# Patient Record
Sex: MALE | Race: WHITE | HISPANIC OR LATINO | Employment: OTHER | URBAN - METROPOLITAN AREA
[De-identification: names, ages, dates, MRNs, and addresses within clinical notes are randomized per-mention and may not be internally consistent; named-entity substitution may affect disease eponyms.]

---

## 2017-07-13 PROBLEM — R94.39 POSITIVE CARDIAC STRESS TEST: Status: ACTIVE | Noted: 2017-07-13

## 2022-07-12 ENCOUNTER — HOSPITAL ENCOUNTER (OUTPATIENT)
Dept: PREADMISSION TESTING | Facility: OTHER | Age: 72
Discharge: HOME | End: 2022-07-12
Attending: INTERNAL MEDICINE
Payer: MEDICARE

## 2022-07-12 VITALS
HEIGHT: 67 IN | SYSTOLIC BLOOD PRESSURE: 145 MMHG | WEIGHT: 180.75 LBS | DIASTOLIC BLOOD PRESSURE: 65 MMHG | BODY MASS INDEX: 28.37 KG/M2 | OXYGEN SATURATION: 97 % | HEART RATE: 69 BPM | TEMPERATURE: 98 F

## 2022-07-12 DIAGNOSIS — Z01.818 PRE-OP TESTING: Primary | ICD-10-CM

## 2022-07-12 DIAGNOSIS — I70.203 ATHEROSCLEROSIS OF ARTERY OF BOTH LOWER EXTREMITIES: ICD-10-CM

## 2022-07-12 LAB
APTT BLDCRRT: 28.8 SEC (ref 21–32)
INR PPP: 0.9 (ref 0.8–1.2)
PROTHROMBIN TIME: 10.4 SEC (ref 9–12.5)

## 2022-07-12 PROCEDURE — 85610 PROTHROMBIN TIME: CPT | Performed by: INTERNAL MEDICINE

## 2022-07-12 PROCEDURE — 93010 ELECTROCARDIOGRAM REPORT: CPT | Mod: ,,, | Performed by: INTERNAL MEDICINE

## 2022-07-12 PROCEDURE — 93010 EKG 12-LEAD: ICD-10-PCS | Mod: ,,, | Performed by: INTERNAL MEDICINE

## 2022-07-12 PROCEDURE — 85730 THROMBOPLASTIN TIME PARTIAL: CPT | Performed by: INTERNAL MEDICINE

## 2022-07-12 PROCEDURE — 93005 ELECTROCARDIOGRAM TRACING: CPT

## 2022-07-12 PROCEDURE — 36415 COLL VENOUS BLD VENIPUNCTURE: CPT | Performed by: INTERNAL MEDICINE

## 2022-07-12 RX ORDER — IPRATROPIUM BROMIDE 21 UG/1
2 SPRAY, METERED NASAL DAILY PRN
COMMUNITY

## 2022-07-12 RX ORDER — NAPROXEN 500 MG/1
500 TABLET ORAL
Status: ON HOLD | COMMUNITY
End: 2022-08-19 | Stop reason: HOSPADM

## 2022-07-12 RX ORDER — PHENYLEPHRINE HCL 10 MG
500 TABLET ORAL DAILY
Status: ON HOLD | COMMUNITY
End: 2022-08-19 | Stop reason: HOSPADM

## 2022-07-12 RX ORDER — CALCIUM CARB, CITRATE, MALATE 250 MG
CAPSULE ORAL
Status: ON HOLD | COMMUNITY
End: 2022-08-19 | Stop reason: HOSPADM

## 2022-07-12 RX ORDER — ZOLPIDEM TARTRATE 10 MG/1
5 TABLET ORAL NIGHTLY PRN
COMMUNITY
End: 2023-07-14 | Stop reason: CLARIF

## 2022-07-12 RX ORDER — LANOLIN ALCOHOL/MO/W.PET/CERES
100 CREAM (GRAM) TOPICAL DAILY
COMMUNITY

## 2022-07-12 RX ORDER — CLOPIDOGREL BISULFATE 75 MG/1
75 TABLET ORAL DAILY
COMMUNITY

## 2022-07-12 RX ORDER — ACETAMINOPHEN 500 MG
5000 TABLET ORAL DAILY
Status: ON HOLD | COMMUNITY
End: 2022-08-19 | Stop reason: HOSPADM

## 2022-07-12 RX ORDER — BROMOCRIPTINE MESYLATE 2.5 MG/1
2.5 TABLET ORAL DAILY
COMMUNITY

## 2022-07-12 RX ORDER — LORAZEPAM 0.5 MG/1
0.5 TABLET ORAL EVERY 6 HOURS PRN
COMMUNITY

## 2022-07-12 RX ORDER — VITAMIN B COMPLEX
1 CAPSULE ORAL WEEKLY
Status: ON HOLD | COMMUNITY
End: 2022-08-19 | Stop reason: HOSPADM

## 2022-07-12 RX ORDER — CETIRIZINE HYDROCHLORIDE 10 MG/1
10 TABLET ORAL DAILY
COMMUNITY
End: 2022-08-19

## 2022-07-12 RX ORDER — IBUPROFEN 100 MG/5ML
1000 SUSPENSION, ORAL (FINAL DOSE FORM) ORAL DAILY
COMMUNITY
End: 2023-07-14 | Stop reason: CLARIF

## 2022-07-12 RX ORDER — MAGNESIUM 200 MG
TABLET ORAL ONCE
Status: ON HOLD | COMMUNITY
End: 2022-08-19 | Stop reason: HOSPADM

## 2022-07-12 RX ORDER — DIAZEPAM 5 MG/1
5 TABLET ORAL ONCE
Status: DISCONTINUED | OUTPATIENT
Start: 2022-07-15 | End: 2022-07-12

## 2022-07-12 NOTE — DISCHARGE INSTRUCTIONS
Information to Prepare you for your Surgery    PRE-ADMIT TESTING -  [phone]    [address] [facility]          Your surgery has been scheduled at [facility]. We are pleased to have the opportunity to serve you. For Further Information please call [phone].    On the day of surgery please report to the Information Desk on the 1st floor.    CONTACT YOUR PHYSICIAN'S OFFICE THE DAY PRIOR TO YOUR SURGERY TO OBTAIN YOUR ARRIVAL TIME.     The evening before surgery do not eat anything after 9 p.m. ( this includes hard candy, chewing gum and mints).  You may only have GATORADE, POWERADE AND WATER  from 9 p.m. until you leave your home.   DO NOT DRINK ANY LIQUIDS ON THE WAY TO THE HOSPITAL.      Why does your anesthesiologist allow you to drink Gatorade/Powerade before surgery?  Gatorade/Powerade helps to increase your comfort before surgery and to decrease your nausea after surgery. The carbohydrates in Gatorade/Powerade help reduce your body's stress response to surgery.  If you are a diabetic-drink only water prior to surgery.      Current Visitor policy(12/27/2021) - Patients may have 2 visitors pre and post procedure. Only 2 visitors will be allowed in the Surgical building with the patient.     SPECIAL MEDICATION INSTRUCTIONS: TAKE medications checked off by the Anesthesiologist on your Medication List.    Angiogram Patients: Take medications as instructed by your physician, including aspirin.     Surgery Patients:    If you take ASPIRIN - Your PHYSICIAN/SURGEON will need to inform you IF/OR when you need to stop taking aspirin prior to your surgery.     Do Not take any medications containing IBUPROFEN.    Do Not Wear any make-up (especially eye make-up) to surgery. Please remove any false eyelashes or eyelash extensions. If you arrive the day of surgery with makeup/eyelashes on you will be required to remove prior to surgery. (There is a risk of corneal  abrasions if eye makeup/eyelash extensions are not removed)      Leave all valuables at home.   Do Not wear any jewelry or watches, including any metal in body piercings. Jewelry must be removed prior to coming to the hospital.  There is a possibility that rings that are unable to be removed may be cut off if they are on the surgical extremity.    Please remove all hair extensions, wigs, clips and any other metal accessories/ ornaments from your hair.  These items may pose a flammable/fire risk in Surgery and must be removed.    Do not shave your surgical area at least 5 days prior to your surgery. The surgical prep will be performed at the hospital according to Infection Control regulations.    Contact Lens must be removed before surgery. Either do not wear the contact lens or bring a case and solution for storage.  Please bring a container for eyeglasses or dentures as required.  Bring any paperwork your physician has provided, such as consent forms,  history and physicals, doctor's orders, etc.   Bring comfortable clothes that are loose fitting to wear upon discharge. Take into consideration the type of surgery being performed.  Maintain your diet as advised per your physician the day prior to surgery.      Adequate rest the night before surgery is advised.   Park in the Parking lot behind the hospital or in the [employer] [facility] across the street from the parking lot. Parking is complimentary.  If you will be discharged the same day as your procedure, please arrange for a responsible adult to drive you home or to accompany you if traveling by taxi.   YOU WILL NOT BE PERMITTED TO DRIVE OR TO LEAVE THE HOSPITAL ALONE AFTER SURGERY.   If you are being discharged the same day, it is strongly recommended that you arrange for someone to remain with you for the first 24 hrs following your surgery.    The Surgeon will speak to your family/visitor after your surgery regarding the outcome of your surgery and post op  care.  The Surgeon may speak to you after your surgery, but there is a possibility you may not remember the details.  Please check with your family members regarding the conversation with the Surgeon.    We strongly recommend whoever is bringing you home be present for discharge instructions.  This will ensure a thorough understanding for your post op home care.    ALL CHILDREN MUST ALWAYS BE ACCOMPANIED BY AN ADULT.    Visitors-Refer to current Visitor policy handouts.    Thank you for your cooperation.  The Staff of [facility].            Bathing Instructions with Hibiclens    Shower the evening before and morning of your procedure with Hibiclens:  Wash your face with water and your regular face wash/soap  Apply Hibiclens directly on your skin or on a wet washcloth and wash gently. When showering: Move away from the shower stream when applying Hibiclens to avoid rinsing off too soon.  Rinse thoroughly with warm water  Do not dilute Hibiclens        Dry off as usual, do not use any deodorant, powder, body lotions, perfume, after shave or cologne.

## 2022-08-19 ENCOUNTER — HOSPITAL ENCOUNTER (OUTPATIENT)
Facility: OTHER | Age: 72
Discharge: HOME | End: 2022-08-20
Attending: INTERNAL MEDICINE | Admitting: INTERNAL MEDICINE
Payer: MEDICARE

## 2022-08-19 DIAGNOSIS — Z01.818 PRE-OP TESTING: ICD-10-CM

## 2022-08-19 DIAGNOSIS — I70.203 ATHEROSCLEROSIS OF ARTERY OF BOTH LOWER EXTREMITIES: ICD-10-CM

## 2022-08-19 DIAGNOSIS — I70.203: ICD-10-CM

## 2022-08-19 LAB
POCT GLUCOSE: 233 MG/DL (ref 70–110)
POCT GLUCOSE: 291 MG/DL (ref 70–110)
POCT GLUCOSE: 353 MG/DL (ref 70–110)
POCT GLUCOSE: 386 MG/DL (ref 70–110)

## 2022-08-19 PROCEDURE — 36247 INS CATH ABD/L-EXT ART 3RD: CPT | Mod: LT | Performed by: INTERNAL MEDICINE

## 2022-08-19 PROCEDURE — 37225 HC FEM/POPL REVAS W/ATHER: CPT | Mod: LT | Performed by: INTERNAL MEDICINE

## 2022-08-19 PROCEDURE — 82962 GLUCOSE BLOOD TEST: CPT | Performed by: INTERNAL MEDICINE

## 2022-08-19 PROCEDURE — 94761 N-INVAS EAR/PLS OXIMETRY MLT: CPT

## 2022-08-19 PROCEDURE — C1887 CATHETER, GUIDING: HCPCS | Performed by: INTERNAL MEDICINE

## 2022-08-19 PROCEDURE — 37229 HC TIB/PER REVASC W/ATHER: CPT | Mod: LT | Performed by: INTERNAL MEDICINE

## 2022-08-19 PROCEDURE — 63600175 PHARM REV CODE 636 W HCPCS: Performed by: INTERNAL MEDICINE

## 2022-08-19 PROCEDURE — 99153 MOD SED SAME PHYS/QHP EA: CPT | Performed by: INTERNAL MEDICINE

## 2022-08-19 PROCEDURE — C1894 INTRO/SHEATH, NON-LASER: HCPCS | Performed by: INTERNAL MEDICINE

## 2022-08-19 PROCEDURE — 75625 CONTRAST EXAM ABDOMINL AORTA: CPT | Performed by: INTERNAL MEDICINE

## 2022-08-19 PROCEDURE — C1725 CATH, TRANSLUMIN NON-LASER: HCPCS | Performed by: INTERNAL MEDICINE

## 2022-08-19 PROCEDURE — 99152 MOD SED SAME PHYS/QHP 5/>YRS: CPT | Performed by: INTERNAL MEDICINE

## 2022-08-19 PROCEDURE — 25000003 PHARM REV CODE 250: Performed by: INTERNAL MEDICINE

## 2022-08-19 PROCEDURE — C1769 GUIDE WIRE: HCPCS | Performed by: INTERNAL MEDICINE

## 2022-08-19 PROCEDURE — C2623 CATH, TRANSLUMIN, DRUG-COAT: HCPCS | Performed by: INTERNAL MEDICINE

## 2022-08-19 PROCEDURE — 25500020 PHARM REV CODE 255: Performed by: INTERNAL MEDICINE

## 2022-08-19 PROCEDURE — 27201423 OPTIME MED/SURG SUP & DEVICES STERILE SUPPLY: Performed by: INTERNAL MEDICINE

## 2022-08-19 PROCEDURE — C1760 CLOSURE DEV, VASC: HCPCS | Performed by: INTERNAL MEDICINE

## 2022-08-19 RX ORDER — NAPROXEN SODIUM 220 MG/1
81 TABLET, FILM COATED ORAL ONCE
Status: DISCONTINUED | OUTPATIENT
Start: 2022-08-19 | End: 2022-08-19

## 2022-08-19 RX ORDER — SODIUM CHLORIDE 9 MG/ML
INJECTION, SOLUTION INTRAVENOUS CONTINUOUS
Status: DISCONTINUED | OUTPATIENT
Start: 2022-08-19 | End: 2022-08-19

## 2022-08-19 RX ORDER — DIPHENHYDRAMINE HCL 25 MG
25 CAPSULE ORAL
Status: COMPLETED | OUTPATIENT
Start: 2022-08-19 | End: 2022-08-19

## 2022-08-19 RX ORDER — GLUCAGON 1 MG
1 KIT INJECTION
Status: DISCONTINUED | OUTPATIENT
Start: 2022-08-19 | End: 2022-08-20 | Stop reason: HOSPADM

## 2022-08-19 RX ORDER — ATROPINE SULFATE 0.1 MG/ML
0.5 INJECTION INTRAVENOUS
Status: DISCONTINUED | OUTPATIENT
Start: 2022-08-19 | End: 2022-08-20 | Stop reason: HOSPADM

## 2022-08-19 RX ORDER — HEPARIN SOD,PORCINE/0.9 % NACL 1000/500ML
INTRAVENOUS SOLUTION INTRAVENOUS
Status: DISCONTINUED | OUTPATIENT
Start: 2022-08-19 | End: 2022-08-19 | Stop reason: HOSPADM

## 2022-08-19 RX ORDER — DIAZEPAM 5 MG/1
5 TABLET ORAL
Status: COMPLETED | OUTPATIENT
Start: 2022-08-19 | End: 2022-08-19

## 2022-08-19 RX ORDER — DEXTROSE MONOHYDRATE 100 MG/ML
INJECTION, SOLUTION INTRAVENOUS CONTINUOUS PRN
Status: DISCONTINUED | OUTPATIENT
Start: 2022-08-19 | End: 2022-08-19

## 2022-08-19 RX ORDER — ACETAMINOPHEN 325 MG/1
650 TABLET ORAL EVERY 4 HOURS PRN
Status: DISCONTINUED | OUTPATIENT
Start: 2022-08-19 | End: 2022-08-20 | Stop reason: HOSPADM

## 2022-08-19 RX ORDER — NITROGLYCERIN 0.4 MG/1
0.4 TABLET SUBLINGUAL EVERY 5 MIN PRN
Status: DISCONTINUED | OUTPATIENT
Start: 2022-08-19 | End: 2022-08-20 | Stop reason: HOSPADM

## 2022-08-19 RX ORDER — CLOPIDOGREL 300 MG/1
TABLET, FILM COATED ORAL
Status: DISCONTINUED | OUTPATIENT
Start: 2022-08-19 | End: 2022-08-19 | Stop reason: HOSPADM

## 2022-08-19 RX ORDER — MAG HYDROX/ALUMINUM HYD/SIMETH 200-200-20
15 SUSPENSION, ORAL (FINAL DOSE FORM) ORAL EVERY 6 HOURS PRN
Status: DISCONTINUED | OUTPATIENT
Start: 2022-08-19 | End: 2022-08-20 | Stop reason: HOSPADM

## 2022-08-19 RX ORDER — NITROGLYCERIN 5 MG/ML
INJECTION, SOLUTION INTRAVENOUS
Status: DISCONTINUED | OUTPATIENT
Start: 2022-08-19 | End: 2022-08-19 | Stop reason: HOSPADM

## 2022-08-19 RX ORDER — VERAPAMIL HYDROCHLORIDE 2.5 MG/ML
INJECTION, SOLUTION INTRAVENOUS
Status: DISCONTINUED | OUTPATIENT
Start: 2022-08-19 | End: 2022-08-19 | Stop reason: HOSPADM

## 2022-08-19 RX ORDER — MIDAZOLAM HYDROCHLORIDE 1 MG/ML
INJECTION, SOLUTION INTRAMUSCULAR; INTRAVENOUS
Status: DISCONTINUED | OUTPATIENT
Start: 2022-08-19 | End: 2022-08-19 | Stop reason: HOSPADM

## 2022-08-19 RX ORDER — METFORMIN HYDROCHLORIDE 500 MG/1
500 TABLET ORAL 2 TIMES DAILY WITH MEALS
Status: DISCONTINUED | OUTPATIENT
Start: 2022-08-19 | End: 2022-08-20 | Stop reason: HOSPADM

## 2022-08-19 RX ORDER — IBUPROFEN 200 MG
24 TABLET ORAL
Status: DISCONTINUED | OUTPATIENT
Start: 2022-08-19 | End: 2022-08-20 | Stop reason: HOSPADM

## 2022-08-19 RX ORDER — HEPARIN SODIUM 1000 [USP'U]/ML
INJECTION, SOLUTION INTRAVENOUS; SUBCUTANEOUS
Status: DISCONTINUED | OUTPATIENT
Start: 2022-08-19 | End: 2022-08-19 | Stop reason: HOSPADM

## 2022-08-19 RX ORDER — LIDOCAINE HYDROCHLORIDE 10 MG/ML
INJECTION, SOLUTION EPIDURAL; INFILTRATION; INTRACAUDAL; PERINEURAL
Status: DISCONTINUED | OUTPATIENT
Start: 2022-08-19 | End: 2022-08-19 | Stop reason: HOSPADM

## 2022-08-19 RX ORDER — HYDROCODONE BITARTRATE AND ACETAMINOPHEN 10; 325 MG/1; MG/1
1 TABLET ORAL EVERY 4 HOURS PRN
Status: DISCONTINUED | OUTPATIENT
Start: 2022-08-19 | End: 2022-08-20 | Stop reason: HOSPADM

## 2022-08-19 RX ORDER — GLUCAGON 1 MG
1 KIT INJECTION
Status: DISCONTINUED | OUTPATIENT
Start: 2022-08-19 | End: 2022-08-19

## 2022-08-19 RX ORDER — SODIUM CHLORIDE 9 MG/ML
INJECTION, SOLUTION INTRAVENOUS CONTINUOUS
Status: ACTIVE | OUTPATIENT
Start: 2022-08-19 | End: 2022-08-19

## 2022-08-19 RX ORDER — ALPRAZOLAM 0.25 MG/1
0.25 TABLET ORAL EVERY 8 HOURS PRN
Status: DISCONTINUED | OUTPATIENT
Start: 2022-08-19 | End: 2022-08-20 | Stop reason: HOSPADM

## 2022-08-19 RX ORDER — ASPIRIN 81 MG/1
81 TABLET ORAL DAILY
Status: DISCONTINUED | OUTPATIENT
Start: 2022-08-20 | End: 2022-08-20 | Stop reason: HOSPADM

## 2022-08-19 RX ORDER — FENTANYL CITRATE 50 UG/ML
INJECTION, SOLUTION INTRAMUSCULAR; INTRAVENOUS
Status: DISCONTINUED | OUTPATIENT
Start: 2022-08-19 | End: 2022-08-19 | Stop reason: HOSPADM

## 2022-08-19 RX ORDER — IODIXANOL 270 MG/ML
INJECTION, SOLUTION INTRAVASCULAR
Status: DISCONTINUED | OUTPATIENT
Start: 2022-08-19 | End: 2022-08-19 | Stop reason: HOSPADM

## 2022-08-19 RX ORDER — NAPROXEN SODIUM 220 MG/1
81 TABLET, FILM COATED ORAL ONCE
Status: COMPLETED | OUTPATIENT
Start: 2022-08-19 | End: 2022-08-19

## 2022-08-19 RX ORDER — LIDOCAINE HYDROCHLORIDE 10 MG/ML
10 INJECTION INFILTRATION; PERINEURAL ONCE AS NEEDED
Status: DISCONTINUED | OUTPATIENT
Start: 2022-08-19 | End: 2022-08-20 | Stop reason: HOSPADM

## 2022-08-19 RX ORDER — DIPHENHYDRAMINE HYDROCHLORIDE 50 MG/ML
25 INJECTION INTRAMUSCULAR; INTRAVENOUS EVERY 6 HOURS PRN
Status: DISCONTINUED | OUTPATIENT
Start: 2022-08-19 | End: 2022-08-20 | Stop reason: HOSPADM

## 2022-08-19 RX ORDER — CLOPIDOGREL BISULFATE 75 MG/1
75 TABLET ORAL DAILY
Status: DISCONTINUED | OUTPATIENT
Start: 2022-08-20 | End: 2022-08-20 | Stop reason: HOSPADM

## 2022-08-19 RX ORDER — HYDROCODONE BITARTRATE AND ACETAMINOPHEN 5; 325 MG/1; MG/1
1 TABLET ORAL EVERY 4 HOURS PRN
Status: DISCONTINUED | OUTPATIENT
Start: 2022-08-19 | End: 2022-08-20 | Stop reason: HOSPADM

## 2022-08-19 RX ORDER — IBUPROFEN 200 MG
16 TABLET ORAL
Status: DISCONTINUED | OUTPATIENT
Start: 2022-08-19 | End: 2022-08-20 | Stop reason: HOSPADM

## 2022-08-19 RX ORDER — ONDANSETRON 8 MG/1
8 TABLET, ORALLY DISINTEGRATING ORAL EVERY 8 HOURS PRN
Status: DISCONTINUED | OUTPATIENT
Start: 2022-08-19 | End: 2022-08-20 | Stop reason: HOSPADM

## 2022-08-19 RX ADMIN — METFORMIN HYDROCHLORIDE 500 MG: 500 TABLET, FILM COATED ORAL at 05:08

## 2022-08-19 RX ADMIN — ALPRAZOLAM 0.25 MG: 0.25 TABLET ORAL at 10:08

## 2022-08-19 RX ADMIN — DIPHENHYDRAMINE HYDROCHLORIDE 25 MG: 25 CAPSULE ORAL at 06:08

## 2022-08-19 RX ADMIN — ASPIRIN 81 MG CHEWABLE TABLET 81 MG: 81 TABLET CHEWABLE at 07:08

## 2022-08-19 RX ADMIN — DIAZEPAM 5 MG: 5 TABLET ORAL at 06:08

## 2022-08-19 RX ADMIN — SODIUM CHLORIDE: 0.9 INJECTION, SOLUTION INTRAVENOUS at 11:08

## 2022-08-19 RX ADMIN — SODIUM CHLORIDE: 0.9 INJECTION, SOLUTION INTRAVENOUS at 06:08

## 2022-08-19 RX ADMIN — SITAGLIPTIN 50 MG: 25 TABLET, FILM COATED ORAL at 05:08

## 2022-08-19 NOTE — PLAN OF CARE
Pt prefers wife Joya at the bedside for discharge.  Pt blood sugar was 233, did not take his low dose aspirin the morning of the surgery, has bruise at R ankle. Dr Mendiola called but did not answer the phone, left message and waiting for him to call back for updates.

## 2022-08-19 NOTE — NURSING
Spoke with MD regarding elevated POCT BGs,  Ok by MD to give home meds- janumet available by our pharmacy (januvia, metformin)     Patient ambulated to bathroom with no problems, no pain, no complains    Will continue to monitor

## 2022-08-19 NOTE — Clinical Note
The left DP pulse was detected with doppler. The right DP pulse was 1+.  The left PT pulse was detected with doppler. The right PT pulse was 1+.

## 2022-08-19 NOTE — Clinical Note
An angiography of the  abdominal aorta was performed with the catheter and power injected with 80 mL contrast at at 8 mL/s.

## 2022-08-20 VITALS
TEMPERATURE: 98 F | HEART RATE: 75 BPM | DIASTOLIC BLOOD PRESSURE: 99 MMHG | OXYGEN SATURATION: 97 % | HEIGHT: 67 IN | SYSTOLIC BLOOD PRESSURE: 129 MMHG | RESPIRATION RATE: 24 BRPM | BODY MASS INDEX: 29.27 KG/M2 | WEIGHT: 186.5 LBS

## 2022-08-20 LAB
ANION GAP SERPL CALC-SCNC: 8 MMOL/L (ref 8–16)
BUN SERPL-MCNC: 15 MG/DL (ref 8–23)
CALCIUM SERPL-MCNC: 8.8 MG/DL (ref 8.7–10.5)
CHLORIDE SERPL-SCNC: 103 MMOL/L (ref 95–110)
CO2 SERPL-SCNC: 24 MMOL/L (ref 23–29)
CREAT SERPL-MCNC: 0.9 MG/DL (ref 0.5–1.4)
EST. GFR  (NO RACE VARIABLE): >60 ML/MIN/1.73 M^2
GLUCOSE SERPL-MCNC: 291 MG/DL (ref 70–110)
HCT VFR BLD AUTO: 40 % (ref 40–54)
HGB BLD-MCNC: 13.7 G/DL (ref 14–18)
POTASSIUM SERPL-SCNC: 4.4 MMOL/L (ref 3.5–5.1)
SODIUM SERPL-SCNC: 135 MMOL/L (ref 136–145)

## 2022-08-20 PROCEDURE — 36415 COLL VENOUS BLD VENIPUNCTURE: CPT | Performed by: INTERNAL MEDICINE

## 2022-08-20 PROCEDURE — 80048 BASIC METABOLIC PNL TOTAL CA: CPT | Performed by: INTERNAL MEDICINE

## 2022-08-20 PROCEDURE — 85018 HEMOGLOBIN: CPT | Performed by: INTERNAL MEDICINE

## 2022-08-20 PROCEDURE — 25000003 PHARM REV CODE 250: Performed by: INTERNAL MEDICINE

## 2022-08-20 PROCEDURE — 85014 HEMATOCRIT: CPT | Performed by: INTERNAL MEDICINE

## 2022-08-20 RX ADMIN — ASPIRIN 81 MG: 81 TABLET, COATED ORAL at 08:08

## 2022-08-20 RX ADMIN — METFORMIN HYDROCHLORIDE 500 MG: 500 TABLET, FILM COATED ORAL at 07:08

## 2022-08-20 RX ADMIN — SITAGLIPTIN 50 MG: 25 TABLET, FILM COATED ORAL at 07:08

## 2022-08-20 RX ADMIN — CLOPIDOGREL 75 MG: 75 TABLET, FILM COATED ORAL at 08:08

## 2022-08-20 NOTE — PLAN OF CARE
Problem: Adult Inpatient Plan of Care  Goal: Absence of Hospital-Acquired Illness or Injury  Outcome: Ongoing, Progressing  Intervention: Identify and Manage Fall Risk  Flowsheets (Taken 8/20/2022 0404)  Safety Promotion/Fall Prevention:   assistive device/personal item within reach   bed alarm set   commode/urinal/bedpan at bedside   Fall Risk reviewed with patient/family   Fall Risk signage in place   lighting adjusted   medications reviewed   nonskid shoes/socks when out of bed   pulse ox   room near unit station   side rails raised x 2   instructed to call staff for mobility  Intervention: Prevent Skin Injury  Flowsheets (Taken 8/20/2022 0404)  Body Position:   weight shifting   position changed independently  Skin Protection:   adhesive use limited   incontinence pads utilized   silicone foam dressing in place   skin-to-skin areas padded   tubing/devices free from skin contact   transparent dressing maintained  Intervention: Prevent and Manage VTE (Venous Thromboembolism) Risk  Flowsheets (Taken 8/20/2022 0404)  Activity Management: Ambulated to bathroom - L4  VTE Prevention/Management:   ambulation promoted   bleeding precations maintained   bleeding risk assessed   fluids promoted   ROM (active) performed  Range of Motion: active ROM (range of motion) encouraged  Intervention: Prevent Infection  Flowsheets (Taken 8/20/2022 0404)  Infection Prevention:   equipment surfaces disinfected   hand hygiene promoted   personal protective equipment utilized   rest/sleep promoted

## 2022-08-20 NOTE — DISCHARGE SUMMARY
[facility] Intensive [facility]  Cardiology  Discharge Summary      Patient Name: Marcelo Mueller    Admission Date: 8/19/2022    Discharge Date and Time: 8/20/22    Final Diagnoses: PAD   Principal Problem: Atherosclerosis of artery of both lower extremities    HPI:  Mr. Mueller is a 72-year-old gentleman who presented to the office with complaints of exertional pain in the left calf.  He has a history of diabetes mellitus since 2005, hypertension, has had a drug-eluting stent placed in the obvious marginal branch in June 2018.  Had a drug-eluting stent placed in the right coronary artery in November 2011.  He underwent orbital atherectomy and drug coated balloon angioplasty of the right SFA in November 2021.  He is in remission for Hodgkin's disease in the retroperitoneal lymph nodes.  He is an ex cigarette smoker.  He was admitted for aortofemoral angiography via the right groin.      Impression on Admission:  Peripheral vascular disease    Hospital Course:  At angiography he was noted to have calcific tandem stenoses in the left mid SFA.  Below the knee he had a tubular stenosis in the tibioperoneal trunk, and a patent single-vessel peroneal runoff.  The anterior tibial and posterior tibial on the left side were occluded.  He underwent orbital atherectomy and drug coated balloon angioplasty of the left SFA, and orbital atherectomy and balloon angioplasty of the left tibioperoneal trunk, with good radiographic results.  A Mynx closure was applied to the right femoral artery.  He was discharged following morning for outpatient follow-up and to resume his by Janumet.  Disposition:  Discharged to home  Follow up/Patient Instructions:    Medications:     Medication List      CONTINUE taking these medications    amLODIPine 5 MG tablet  Commonly known as: NORVASC     ascorbic acid (vitamin C) 1000 MG tablet  Commonly known as: VITAMIN C     aspirin 81 MG EC tablet  Commonly known as: ECOTRIN     atorvastatin 40 MG  tablet  Commonly known as: LIPITOR     bromocriptine 2.5 mg Tab  Commonly known as: PARLODEL     clopidogreL 75 mg tablet  Commonly known as: PLAVIX     cyanocobalamin 1000 MCG tablet  Commonly known as: VITAMIN B-12     ipratropium 21 mcg (0.03 %) nasal spray  Commonly known as: ATROVENT     LORazepam 0.5 MG tablet  Commonly known as: ATIVAN     losartan 50 MG tablet  Commonly known as: COZAAR     SITagliptan-metformin  mg per tablet  Commonly known as: JANUMET     zolpidem 10 mg Tab  Commonly known as: AMBIEN        STOP taking these medications    b complex vitamins capsule     cetirizine 10 MG tablet  Commonly known as: ZYRTEC     cholecalciferol (vitamin D3) 125 mcg (5,000 unit) Tab     cinnamon bark 500 mg capsule     magnesium 200 mg Tab     naproxen 500 MG tablet  Commonly known as: NAPROSYN     potassium citrate 99 mg Cap     SANCUSO 3.1 mg/24 hour  Generic drug: granisetron     UNABLE TO FIND     UNABLE TO FIND     UNABLE TO FIND          No discharge procedures on file.   Follow-up Information     Javon Mendiola MD Follow up in 2 week(s).    Specialties: Cardiology, Interventional Cardiology  Contact information:  [address]  [facility] [id number] [phone]             Javon Mendiola MD Follow up in 2 week(s).    Specialties: Cardiology, Interventional Cardiology  Contact information:  [address]  [facility] [address] [phone]                         Condition upon Discharge:  Good        Javon Mendiola MD

## 2022-08-20 NOTE — NURSING
AVS summary printed. Discharge instructions and education provided. Pt voices understanding. IV site removed, cath tip intact, telemetry discontinued without adverse reactions. Pt shows no acute distress. Pt wheeled down. Pt picked up by wife.

## 2022-08-21 LAB — POCT GLUCOSE: 260 MG/DL (ref 70–110)

## 2022-08-31 NOTE — H&P
[facility] Intensive Care ([place])  History & Physical    History of Present Illness: Mr Mueller is a 72 year old man who was admitted with complaints of exertional pain in the left calf.  He was admitted for aortofemoral angiography via the right groin.  Past history of HBP and DM since 2005  SHAHID in OM in 2018  SHAHID in RCA in 2021  Orbital atherectomy and DCB right SFA in 2021  Moderate aortic stenosis  History of Hodgkins in 2018  Ex smoker    No medications prior to admission.       Review of patient's allergies indicates:   Allergen Reactions    Bactrim [sulfamethoxazole-trimethoprim] Hives    Ciprofloxacin Hives    Pcn [penicillins]        Past Medical History:   Diagnosis Date    Cancer     Diabetes mellitus     Hyperlipidemia     Hypertension     Non Hodgkin's lymphoma     ST segment depression      Past Surgical History:   Procedure Laterality Date    CORONARY STENT PLACEMENT      KNEE SURGERY      PORTACATH PLACEMENT      port a cath removal    SHOULDER SURGERY       No family history on file.  Social History     Tobacco Use    Smoking status: Former    Smokeless tobacco: Never   Substance Use Topics    Drug use: No        Physical Exam:  Chest clear  Heart: basal ejection systolic murmur, radiated to the neck  Abdomen soft, non tender  Ext: warm. No DP/PT pulse  Neuro: intact.    Impression on Admission:   PAD with left calf claudication  CAD  Moderate AS  HBP  DM  Hodgkin's in remission  Ex smoker

## 2023-07-14 ENCOUNTER — HOSPITAL ENCOUNTER (OUTPATIENT)
Dept: PREADMISSION TESTING | Facility: OTHER | Age: 73
Discharge: HOME | End: 2023-07-14
Attending: INTERNAL MEDICINE
Payer: MEDICARE

## 2023-07-14 VITALS
DIASTOLIC BLOOD PRESSURE: 74 MMHG | HEIGHT: 67 IN | HEART RATE: 75 BPM | BODY MASS INDEX: 29.19 KG/M2 | SYSTOLIC BLOOD PRESSURE: 109 MMHG | OXYGEN SATURATION: 98 % | WEIGHT: 186 LBS

## 2023-07-14 DIAGNOSIS — Z01.818 PREOP TESTING: Primary | ICD-10-CM

## 2023-07-14 LAB
APTT PPP: 29.3 SEC (ref 21–32)
INR PPP: 1 (ref 0.8–1.2)
PROTHROMBIN TIME: 10.9 SEC (ref 9–12.5)

## 2023-07-14 PROCEDURE — 85730 THROMBOPLASTIN TIME PARTIAL: CPT | Performed by: INTERNAL MEDICINE

## 2023-07-14 PROCEDURE — 36415 COLL VENOUS BLD VENIPUNCTURE: CPT | Performed by: INTERNAL MEDICINE

## 2023-07-14 PROCEDURE — 85610 PROTHROMBIN TIME: CPT | Performed by: INTERNAL MEDICINE

## 2023-07-14 RX ORDER — FAMOTIDINE 40 MG/1
40 TABLET, FILM COATED ORAL
COMMUNITY
Start: 2023-05-22

## 2023-07-14 RX ORDER — AZELASTINE 1 MG/ML
SPRAY, METERED NASAL
COMMUNITY
Start: 2023-05-30

## 2023-07-14 RX ORDER — EVOLOCUMAB 140 MG/ML
INJECTION, SOLUTION SUBCUTANEOUS
COMMUNITY
Start: 2023-03-15

## 2023-07-14 NOTE — DISCHARGE INSTRUCTIONS
Information to Prepare you for your Surgery    PRE-ADMIT TESTING -  [phone]    [address] [facility]          Your surgery has been scheduled at [facility]. We are pleased to have the opportunity to serve you. For Further Information please call [phone].    On the day of surgery please report to the Information Desk on the 1st floor.    CONTACT YOUR PHYSICIAN'S OFFICE THE DAY PRIOR TO YOUR SURGERY TO OBTAIN YOUR ARRIVAL TIME.     The evening before surgery do not eat anything after 9 p.m. ( this includes hard candy, chewing gum and mints).  You may only have GATORADE, POWERADE AND WATER  from 9 p.m. until you leave your home.   DO NOT DRINK ANY LIQUIDS ON THE WAY TO THE HOSPITAL.      Why does your anesthesiologist allow you to drink Gatorade/Powerade before surgery?  Gatorade/Powerade helps to increase your comfort before surgery and to decrease your nausea after surgery. The carbohydrates in Gatorade/Powerade help reduce your body's stress response to surgery.  If you are a diabetic-drink only water prior to surgery.       Patients may have 2 visitors pre and post procedure. Only 2 visitors will be allowed in the Surgical building with the patient. No one under the age of 12 will be allowed into the facility.    SPECIAL MEDICATION INSTRUCTIONS: TAKE medications checked off by the Anesthesiologist on your Medication List.    Angiogram Patients: Take medications as instructed by your physician, including aspirin.     Surgery Patients:    If you take ASPIRIN - Your PHYSICIAN/SURGEON will need to inform you IF/OR when you need to stop taking aspirin prior to your surgery.     The week prior to surgery do not ot take any medications containing IBUPROFEN or NSAIDS ( Advil, Motrin, Goodys, BC, Aleve, Naproxen etc) If you are not sure if you should take a medicine please call your surgeon's office.  Ok to take Tylenol    Do Not Wear any make-up  (especially eye make-up) to surgery. Please remove any false eyelashes or eyelash extensions. If you arrive the day of surgery with makeup/eyelashes on you will be required to remove prior to surgery. (There is a risk of corneal abrasions if eye makeup/eyelash extensions are not removed)      Leave all valuables at home.   Do Not wear any jewelry or watches, including any metal in body piercings. Jewelry must be removed prior to coming to the hospital.  There is a possibility that rings that are unable to be removed may be cut off if they are on the surgical extremity.    Please remove all hair extensions, wigs, clips and any other metal accessories/ ornaments from your hair.  These items may pose a flammable/fire risk in Surgery and must be removed.    Do not shave your surgical area at least 5 days prior to your surgery. The surgical prep will be performed at the hospital according to Infection Control regulations.    Contact Lens must be removed before surgery. Either do not wear the contact lens or bring a case and solution for storage.  Please bring a container for eyeglasses or dentures as required.  Bring any paperwork your physician has provided, such as consent forms,  history and physicals, doctor's orders, etc.   Bring comfortable clothes that are loose fitting to wear upon discharge. Take into consideration the type of surgery being performed.  Maintain your diet as advised per your physician the day prior to surgery.      Adequate rest the night before surgery is advised.   Park in the Parking lot behind the hospital or in the [employer] [facility] across the street from the parking lot. Parking is complimentary.  If you will be discharged the same day as your procedure, please arrange for a responsible adult to drive you home or to accompany you if traveling by taxi.   YOU WILL NOT BE PERMITTED TO DRIVE OR TO LEAVE THE HOSPITAL ALONE AFTER SURGERY.   If you are being discharged the same day, it is  strongly recommended that you arrange for someone to remain with you for the first 24 hrs following your surgery.    The Surgeon will speak to your family/visitor after your surgery regarding the outcome of your surgery and post op care.  The Surgeon may speak to you after your surgery, but there is a possibility you may not remember the details.  Please check with your family members regarding the conversation with the Surgeon.    We strongly recommend whoever is bringing you home be present for discharge instructions.  This will ensure a thorough understanding for your post op home care.    ALL CHILDREN MUST ALWAYS BE ACCOMPANIED BY AN ADULT.    Visitors-Refer to current Visitor policy handouts.    Thank you for your cooperation.  The Staff of [facility].            Bathing Instructions with Hibiclens    Shower the evening before and morning of your procedure with Chlorhexidine (Hibiclens)  do not use Chlorhexidine on your face or genitals. Do not get in your eyes.  Wash your face with water and your regular face wash/soap  Use your regular shampoo  Apply Chlorhexidine (Hibiclens) directly on your skin or on a wet washcloth and wash gently. When showering: Move away from the shower stream when applying Chlorhexidine (Hibiclens) to avoid rinsing off too soon.  Rinse thoroughly with warm water  Do not dilute Chlorhexidine (Hibiclens)   Dry off as usual, do not use any deodorant, powder, body lotions, perfume, after shave or cologne.

## 2023-07-28 ENCOUNTER — HOSPITAL ENCOUNTER (OUTPATIENT)
Facility: OTHER | Age: 73
Discharge: HOME | End: 2023-07-29
Attending: INTERNAL MEDICINE | Admitting: INTERNAL MEDICINE
Payer: MEDICARE

## 2023-07-28 DIAGNOSIS — I73.9 PERIPHERAL VASCULAR DISEASE, UNSPECIFIED: ICD-10-CM

## 2023-07-28 DIAGNOSIS — Z01.818 PREOP TESTING: Primary | ICD-10-CM

## 2023-07-28 DIAGNOSIS — I35.0 AORTIC STENOSIS: ICD-10-CM

## 2023-07-28 PROBLEM — I25.10 CORONARY ARTERY DISEASE INVOLVING NATIVE CORONARY ARTERY OF NATIVE HEART WITHOUT ANGINA PECTORIS: Status: ACTIVE | Noted: 2023-07-28

## 2023-07-28 LAB
CATH EF QUANTITATIVE: 65 %
POCT GLUCOSE: 122 MG/DL (ref 70–110)
POCT GLUCOSE: 131 MG/DL (ref 70–110)

## 2023-07-28 PROCEDURE — 37225 HC FEM/POPL REVAS W/ATHER: CPT | Mod: LT | Performed by: INTERNAL MEDICINE

## 2023-07-28 PROCEDURE — C1751 CATH, INF, PER/CENT/MIDLINE: HCPCS | Performed by: INTERNAL MEDICINE

## 2023-07-28 PROCEDURE — 94761 N-INVAS EAR/PLS OXIMETRY MLT: CPT

## 2023-07-28 PROCEDURE — C2623 CATH, TRANSLUMIN, DRUG-COAT: HCPCS | Performed by: INTERNAL MEDICINE

## 2023-07-28 PROCEDURE — C1725 CATH, TRANSLUMIN NON-LASER: HCPCS | Performed by: INTERNAL MEDICINE

## 2023-07-28 PROCEDURE — 99153 MOD SED SAME PHYS/QHP EA: CPT | Performed by: INTERNAL MEDICINE

## 2023-07-28 PROCEDURE — C1887 CATHETER, GUIDING: HCPCS | Performed by: INTERNAL MEDICINE

## 2023-07-28 PROCEDURE — 25000003 PHARM REV CODE 250: Performed by: INTERNAL MEDICINE

## 2023-07-28 PROCEDURE — 99152 MOD SED SAME PHYS/QHP 5/>YRS: CPT | Performed by: INTERNAL MEDICINE

## 2023-07-28 PROCEDURE — C1894 INTRO/SHEATH, NON-LASER: HCPCS | Performed by: INTERNAL MEDICINE

## 2023-07-28 PROCEDURE — 75630 X-RAY AORTA LEG ARTERIES: CPT | Mod: 59 | Performed by: INTERNAL MEDICINE

## 2023-07-28 PROCEDURE — 27201423 OPTIME MED/SURG SUP & DEVICES STERILE SUPPLY: Performed by: INTERNAL MEDICINE

## 2023-07-28 PROCEDURE — C1769 GUIDE WIRE: HCPCS | Performed by: INTERNAL MEDICINE

## 2023-07-28 PROCEDURE — 93460 R&L HRT ART/VENTRICLE ANGIO: CPT | Mod: 59 | Performed by: INTERNAL MEDICINE

## 2023-07-28 PROCEDURE — 63600175 PHARM REV CODE 636 W HCPCS: Performed by: INTERNAL MEDICINE

## 2023-07-28 PROCEDURE — 25500020 PHARM REV CODE 255: Performed by: INTERNAL MEDICINE

## 2023-07-28 PROCEDURE — C1760 CLOSURE DEV, VASC: HCPCS | Performed by: INTERNAL MEDICINE

## 2023-07-28 RX ORDER — FENTANYL CITRATE 50 UG/ML
INJECTION, SOLUTION INTRAMUSCULAR; INTRAVENOUS
Status: DISCONTINUED | OUTPATIENT
Start: 2023-07-28 | End: 2023-07-28 | Stop reason: HOSPADM

## 2023-07-28 RX ORDER — DIPHENHYDRAMINE HCL 25 MG
25 CAPSULE ORAL
Status: COMPLETED | OUTPATIENT
Start: 2023-07-28 | End: 2023-07-28

## 2023-07-28 RX ORDER — NITROGLYCERIN 20 MG/100ML
INJECTION INTRAVENOUS
Status: DISCONTINUED | OUTPATIENT
Start: 2023-07-28 | End: 2023-07-28

## 2023-07-28 RX ORDER — CLOPIDOGREL BISULFATE 75 MG/1
75 TABLET ORAL DAILY
Status: DISCONTINUED | OUTPATIENT
Start: 2023-07-28 | End: 2023-07-29 | Stop reason: HOSPADM

## 2023-07-28 RX ORDER — LANOLIN ALCOHOL/MO/W.PET/CERES
1000 CREAM (GRAM) TOPICAL DAILY
Status: DISCONTINUED | OUTPATIENT
Start: 2023-07-28 | End: 2023-07-29 | Stop reason: HOSPADM

## 2023-07-28 RX ORDER — DIPHENHYDRAMINE HYDROCHLORIDE 50 MG/ML
25 INJECTION INTRAMUSCULAR; INTRAVENOUS EVERY 6 HOURS PRN
Status: DISCONTINUED | OUTPATIENT
Start: 2023-07-28 | End: 2023-07-29 | Stop reason: HOSPADM

## 2023-07-28 RX ORDER — HYDROCODONE BITARTRATE AND ACETAMINOPHEN 5; 325 MG/1; MG/1
1 TABLET ORAL EVERY 4 HOURS PRN
Status: DISCONTINUED | OUTPATIENT
Start: 2023-07-28 | End: 2023-07-29 | Stop reason: HOSPADM

## 2023-07-28 RX ORDER — LIDOCAINE HYDROCHLORIDE 10 MG/ML
10 INJECTION, SOLUTION EPIDURAL; INFILTRATION; INTRACAUDAL; PERINEURAL ONCE AS NEEDED
Status: DISCONTINUED | OUTPATIENT
Start: 2023-07-28 | End: 2023-07-29 | Stop reason: HOSPADM

## 2023-07-28 RX ORDER — IPRATROPIUM BROMIDE 21 UG/1
2 SPRAY, METERED NASAL DAILY PRN
Status: DISCONTINUED | OUTPATIENT
Start: 2023-07-28 | End: 2023-07-29 | Stop reason: HOSPADM

## 2023-07-28 RX ORDER — MAG HYDROX/ALUMINUM HYD/SIMETH 200-200-20
15 SUSPENSION, ORAL (FINAL DOSE FORM) ORAL EVERY 6 HOURS PRN
Status: DISCONTINUED | OUTPATIENT
Start: 2023-07-28 | End: 2023-07-29 | Stop reason: HOSPADM

## 2023-07-28 RX ORDER — ASPIRIN 81 MG/1
81 TABLET ORAL DAILY
Status: DISCONTINUED | OUTPATIENT
Start: 2023-07-28 | End: 2023-07-28

## 2023-07-28 RX ORDER — FAMOTIDINE 20 MG/1
40 TABLET, FILM COATED ORAL DAILY
Status: DISCONTINUED | OUTPATIENT
Start: 2023-07-28 | End: 2023-07-29 | Stop reason: HOSPADM

## 2023-07-28 RX ORDER — LORAZEPAM 0.5 MG/1
0.5 TABLET ORAL EVERY 6 HOURS PRN
Status: DISCONTINUED | OUTPATIENT
Start: 2023-07-28 | End: 2023-07-29 | Stop reason: HOSPADM

## 2023-07-28 RX ORDER — NITROGLYCERIN 0.4 MG/1
0.4 TABLET SUBLINGUAL EVERY 5 MIN PRN
Status: DISCONTINUED | OUTPATIENT
Start: 2023-07-28 | End: 2023-07-29 | Stop reason: HOSPADM

## 2023-07-28 RX ORDER — NITROGLYCERIN 5 MG/ML
INJECTION, SOLUTION INTRAVENOUS
Status: DISCONTINUED | OUTPATIENT
Start: 2023-07-28 | End: 2023-07-28 | Stop reason: HOSPADM

## 2023-07-28 RX ORDER — AZELASTINE 1 MG/ML
1 SPRAY, METERED NASAL 2 TIMES DAILY
Status: DISCONTINUED | OUTPATIENT
Start: 2023-07-28 | End: 2023-07-29 | Stop reason: HOSPADM

## 2023-07-28 RX ORDER — ONDANSETRON 8 MG/1
8 TABLET, ORALLY DISINTEGRATING ORAL EVERY 8 HOURS PRN
Status: DISCONTINUED | OUTPATIENT
Start: 2023-07-28 | End: 2023-07-29 | Stop reason: HOSPADM

## 2023-07-28 RX ORDER — LOSARTAN POTASSIUM 50 MG/1
100 TABLET ORAL DAILY
Status: DISCONTINUED | OUTPATIENT
Start: 2023-07-28 | End: 2023-07-29 | Stop reason: HOSPADM

## 2023-07-28 RX ORDER — ACETAMINOPHEN 325 MG/1
650 TABLET ORAL EVERY 4 HOURS PRN
Status: DISCONTINUED | OUTPATIENT
Start: 2023-07-28 | End: 2023-07-29 | Stop reason: HOSPADM

## 2023-07-28 RX ORDER — NAPROXEN SODIUM 220 MG/1
162 TABLET, FILM COATED ORAL
Status: COMPLETED | OUTPATIENT
Start: 2023-07-28 | End: 2023-07-28

## 2023-07-28 RX ORDER — CLOPIDOGREL BISULFATE 75 MG/1
TABLET ORAL
Status: DISCONTINUED | OUTPATIENT
Start: 2023-07-28 | End: 2023-07-28 | Stop reason: HOSPADM

## 2023-07-28 RX ORDER — NAPROXEN SODIUM 220 MG/1
81 TABLET, FILM COATED ORAL
Status: COMPLETED | OUTPATIENT
Start: 2023-07-28 | End: 2023-07-28

## 2023-07-28 RX ORDER — ALPRAZOLAM 0.25 MG/1
0.25 TABLET ORAL EVERY 8 HOURS PRN
Status: DISCONTINUED | OUTPATIENT
Start: 2023-07-28 | End: 2023-07-28

## 2023-07-28 RX ORDER — SODIUM CHLORIDE 9 MG/ML
INJECTION, SOLUTION INTRAVENOUS CONTINUOUS
Status: DISCONTINUED | OUTPATIENT
Start: 2023-07-28 | End: 2023-07-28

## 2023-07-28 RX ORDER — ASPIRIN 81 MG/1
81 TABLET ORAL 2 TIMES DAILY
Status: DISCONTINUED | OUTPATIENT
Start: 2023-07-28 | End: 2023-07-28

## 2023-07-28 RX ORDER — ATORVASTATIN CALCIUM 20 MG/1
80 TABLET, FILM COATED ORAL DAILY
Status: DISCONTINUED | OUTPATIENT
Start: 2023-07-28 | End: 2023-07-29 | Stop reason: HOSPADM

## 2023-07-28 RX ORDER — ASPIRIN 81 MG/1
81 TABLET ORAL DAILY
Status: DISCONTINUED | OUTPATIENT
Start: 2023-07-29 | End: 2023-07-29 | Stop reason: HOSPADM

## 2023-07-28 RX ORDER — SODIUM CHLORIDE 9 MG/ML
INJECTION, SOLUTION INTRAVENOUS CONTINUOUS
Status: ACTIVE | OUTPATIENT
Start: 2023-07-28 | End: 2023-07-28

## 2023-07-28 RX ORDER — ATROPINE SULFATE 0.1 MG/ML
0.5 INJECTION INTRAVENOUS
Status: DISCONTINUED | OUTPATIENT
Start: 2023-07-28 | End: 2023-07-29 | Stop reason: HOSPADM

## 2023-07-28 RX ORDER — MIDAZOLAM HYDROCHLORIDE 1 MG/ML
INJECTION INTRAMUSCULAR; INTRAVENOUS
Status: DISCONTINUED | OUTPATIENT
Start: 2023-07-28 | End: 2023-07-28 | Stop reason: HOSPADM

## 2023-07-28 RX ORDER — NITROGLYCERIN 0.4 MG/1
0.4 TABLET SUBLINGUAL EVERY 5 MIN PRN
Status: DISCONTINUED | OUTPATIENT
Start: 2023-07-28 | End: 2023-07-28 | Stop reason: HOSPADM

## 2023-07-28 RX ORDER — HYDROCODONE BITARTRATE AND ACETAMINOPHEN 10; 325 MG/1; MG/1
1 TABLET ORAL EVERY 4 HOURS PRN
Status: DISCONTINUED | OUTPATIENT
Start: 2023-07-28 | End: 2023-07-29 | Stop reason: HOSPADM

## 2023-07-28 RX ORDER — DIAZEPAM 5 MG/1
5 TABLET ORAL
Status: COMPLETED | OUTPATIENT
Start: 2023-07-28 | End: 2023-07-28

## 2023-07-28 RX ORDER — CLOPIDOGREL BISULFATE 75 MG/1
75 TABLET ORAL DAILY
Status: DISCONTINUED | OUTPATIENT
Start: 2023-07-29 | End: 2023-07-28

## 2023-07-28 RX ORDER — AMLODIPINE BESYLATE 5 MG/1
5 TABLET ORAL DAILY
Status: DISCONTINUED | OUTPATIENT
Start: 2023-07-28 | End: 2023-07-29 | Stop reason: HOSPADM

## 2023-07-28 RX ADMIN — ATORVASTATIN CALCIUM 80 MG: 20 TABLET, FILM COATED ORAL at 11:07

## 2023-07-28 RX ADMIN — AZELASTINE HYDROCHLORIDE 137 MCG: 137 SPRAY, METERED NASAL at 08:07

## 2023-07-28 RX ADMIN — ASPIRIN 81 MG CHEWABLE TABLET 81 MG: 81 TABLET CHEWABLE at 06:07

## 2023-07-28 RX ADMIN — CYANOCOBALAMIN TAB 1000 MCG 1000 MCG: 1000 TAB at 11:07

## 2023-07-28 RX ADMIN — SODIUM CHLORIDE: 9 INJECTION, SOLUTION INTRAVENOUS at 02:07

## 2023-07-28 RX ADMIN — DIPHENHYDRAMINE HYDROCHLORIDE 25 MG: 25 CAPSULE ORAL at 06:07

## 2023-07-28 RX ADMIN — SODIUM CHLORIDE: 9 INJECTION, SOLUTION INTRAVENOUS at 06:07

## 2023-07-28 RX ADMIN — DIAZEPAM 5 MG: 5 TABLET ORAL at 06:07

## 2023-07-28 RX ADMIN — FAMOTIDINE 40 MG: 20 TABLET, FILM COATED ORAL at 11:07

## 2023-07-28 RX ADMIN — SODIUM CHLORIDE: 9 INJECTION, SOLUTION INTRAVENOUS at 10:07

## 2023-07-28 RX ADMIN — ASPIRIN 81 MG CHEWABLE TABLET 81 MG: 81 TABLET CHEWABLE at 07:07

## 2023-07-28 NOTE — Clinical Note
An angiography of the  abdominal aorta and bilateral lower extremity was performed with the catheter and power injected with 80 mL contrast at at 8 mL/s.

## 2023-07-28 NOTE — Clinical Note
An angiography was performed of the proximal left superficial femoral arterypost intervention  via hand injection with

## 2023-07-28 NOTE — Clinical Note
The PA catheter was repositioned to the right pulmonary artery. Hemodynamics were performed. Cardiac output was obtained at 4 L/min.

## 2023-07-28 NOTE — Clinical Note
The catheter was inserted into the aorta. An angiography was performed of the aorta. The angiography was performed via power injection. The injected amount was 30 mL contrast at 20 mL/s. The PSI from the power injection was 900.

## 2023-07-28 NOTE — Clinical Note
The skin was dry, was intact, was without bleeding and was without hematoma. Mynx closure device to RFA and manual pressure per protocol. RFV sheath to be pulled in ICU at 1130

## 2023-07-28 NOTE — INTERVAL H&P NOTE
The patient has been examined and the H&P has been reviewed:    I concur with the findings and no changes have occurred since H&P was written.     risks, benefits and alternative options discussed and understood by patient/family.          There are no hospital problems to display for this patient.

## 2023-07-29 VITALS
BODY MASS INDEX: 29.58 KG/M2 | HEART RATE: 63 BPM | DIASTOLIC BLOOD PRESSURE: 73 MMHG | TEMPERATURE: 98 F | SYSTOLIC BLOOD PRESSURE: 144 MMHG | RESPIRATION RATE: 18 BRPM | HEIGHT: 66 IN | WEIGHT: 184.06 LBS | OXYGEN SATURATION: 95 %

## 2023-07-29 LAB
ANION GAP SERPL CALC-SCNC: 8 MMOL/L (ref 8–16)
BUN SERPL-MCNC: 11 MG/DL (ref 8–23)
CALCIUM SERPL-MCNC: 8.8 MG/DL (ref 8.7–10.5)
CHLORIDE SERPL-SCNC: 108 MMOL/L (ref 95–110)
CO2 SERPL-SCNC: 23 MMOL/L (ref 23–29)
CREAT SERPL-MCNC: 1 MG/DL (ref 0.5–1.4)
EST. GFR  (NO RACE VARIABLE): >60 ML/MIN/1.73 M^2
GLUCOSE SERPL-MCNC: 137 MG/DL (ref 70–110)
HCT VFR BLD AUTO: 41.3 % (ref 40–54)
HGB BLD-MCNC: 13.5 G/DL (ref 14–18)
POTASSIUM SERPL-SCNC: 4.5 MMOL/L (ref 3.5–5.1)
SODIUM SERPL-SCNC: 139 MMOL/L (ref 136–145)

## 2023-07-29 PROCEDURE — 25000003 PHARM REV CODE 250: Performed by: INTERNAL MEDICINE

## 2023-07-29 PROCEDURE — 85018 HEMOGLOBIN: CPT | Performed by: INTERNAL MEDICINE

## 2023-07-29 PROCEDURE — 85014 HEMATOCRIT: CPT | Performed by: INTERNAL MEDICINE

## 2023-07-29 PROCEDURE — 94761 N-INVAS EAR/PLS OXIMETRY MLT: CPT

## 2023-07-29 PROCEDURE — 80048 BASIC METABOLIC PNL TOTAL CA: CPT | Performed by: INTERNAL MEDICINE

## 2023-07-29 PROCEDURE — 36415 COLL VENOUS BLD VENIPUNCTURE: CPT | Performed by: INTERNAL MEDICINE

## 2023-07-29 RX ADMIN — FAMOTIDINE 40 MG: 20 TABLET, FILM COATED ORAL at 08:07

## 2023-07-29 RX ADMIN — ATORVASTATIN CALCIUM 80 MG: 20 TABLET, FILM COATED ORAL at 08:07

## 2023-07-29 RX ADMIN — AMLODIPINE BESYLATE 5 MG: 5 TABLET ORAL at 08:07

## 2023-07-29 RX ADMIN — CLOPIDOGREL BISULFATE 75 MG: 75 TABLET ORAL at 08:07

## 2023-07-29 RX ADMIN — CYANOCOBALAMIN TAB 1000 MCG 1000 MCG: 1000 TAB at 08:07

## 2023-07-29 RX ADMIN — ASPIRIN 81 MG: 81 TABLET, COATED ORAL at 08:07

## 2023-07-29 RX ADMIN — LOSARTAN POTASSIUM 100 MG: 50 TABLET, FILM COATED ORAL at 08:07

## 2023-07-29 RX ADMIN — AZELASTINE HYDROCHLORIDE 137 MCG: 137 SPRAY, METERED NASAL at 08:07

## 2023-07-29 NOTE — PLAN OF CARE
07/29/23 0944   MENJIVAR Message   Medicare Outpatient and Observation Notification regarding financial responsibility Given to patient/caregiver;Explained to patient/caregiver;Signed/date by patient/caregiver   Date MENJIVAR was signed 07/29/23   Time MENJIVAR was signed 0970

## 2023-07-29 NOTE — NURSING
Pt stable no complaints or distress noted. VSS. IV and Telemetry removed. Pt has all of his belongings. AVS reviewed w/ pt. All questions and concerns addressed. Wife outside to pick pt up. Pt escorted to front of building by RN.

## 2023-07-29 NOTE — NURSING
2300: Right lower eyelid appears slightly red and inflamed, starting at inner corner of lower eyelid. pt states he believes he has a stye and the discmofort started heavenly morning on 7/28. Pt denies Pruritus. I applied warm compress to eye. Angiogram site is clean, dry, and intact. Tegaderm and gauze in place. Cont to monitor for complications.    2400. Pt states he feels relief of eye irritation. Applied another warm compress. Noted that angiogram puncture site is clean dry and intact at this time. No further complaints. Cont to monitor.

## 2023-07-29 NOTE — PLAN OF CARE
07/29/23 0942   Discharge Planning   Assessment Type Discharge Planning Assessment   Resource/Environmental Concerns none   Support Systems Spouse/significant other;None   Equipment Currently Used at Home none   Current Living Arrangements home   Patient/Family Anticipates Transition to home   Patient/Family Anticipated Services at Transition none   DME Needed Upon Discharge  none   Discharge Plan A Home with family   Physical Activity   On average, how many days per week do you engage in moderate to strenuous exercise (like a brisk walk)? 0 days

## 2023-07-29 NOTE — DISCHARGE SUMMARY
[facility] Intensive Care [facility]  Cardiology  Discharge Summary      Patient Name: Marcelo Mueller    Admission Date: 7/28/2023    Discharge Date and Time: 7/29/23      Final Diagnoses: PAD, CAD, Aortic stenosis   Principal Problem: Peripheral vascular disease, unspecified    HPI:  Mr. Mueller is a 73-year-old gentleman who has exertional pains in the calves and exertional chest pains.  During a stress test he had calf pains and chest pains and EKG abnormalities to suggest myocardial ischemia.  He has a history of coronary artery disease having had previous stents in the mid left anterior descending in the mid right coronary artery, aortic stenosis, and has had orbital atherectomy in drug coated balloon angioplasty in each superficial femoral artery, as well as advanced infrapopliteal disease.  He was admitted for right and left heart catheterization, coronary angiography, aortic root injection, aortofemoral angiography and possible transcatheter therapeutic intervention as warranted.    Impression on Admission:  Coronary artery disease, aortic stenosis, peripheral vascular disease.    Hospital Course:  The right heart catheterization was unremarkable, good left ventricular systolic function.  Mild proximal left anterior descending coronary artery disease, patent stents in the mid LAD and the mid right coronary artery.  The aortic valvular stenosis was moderately severe estimated to be 0.77 cm2.  The peak gradient was 52.5 mmHg, the mean gradient was 43 mmHg.  There was mild aortic insufficiency.  There were tandem proximal and mid left SFA stenoses, and tibial and posterior tibials were occluded.  He underwent orbital atherectomy and drug coated balloon angioplasty of the left SFA in its proximal and midportion, with excellent radiographic results.  A Mynx closure was applied to the right groin arteriotomy site, and good hemostasis was achieved.  The morning after the procedure the right groin looks  satisfactory, he felt well was discharged for outpatient follow-up he will continue his dual antiplatelet regimen, his statin and his Repatha.  I will see him for follow-up next week.    Disposition:  Discharged to home    Follow up/Patient Instructions:    Medications:     Medication List        CONTINUE taking these medications      amLODIPine 5 MG tablet  Commonly known as: NORVASC     aspirin 81 MG EC tablet  Commonly known as: ECOTRIN     atorvastatin 40 MG tablet  Commonly known as: LIPITOR     azelastine 137 mcg (0.1 %) nasal spray  Commonly known as: ASTELIN     bromocriptine 2.5 mg Tab  Commonly known as: PARLODEL     clopidogreL 75 mg tablet  Commonly known as: PLAVIX     cyanocobalamin 1000 MCG tablet  Commonly known as: VITAMIN B-12     famotidine 40 MG tablet  Commonly known as: PEPCID     ipratropium 21 mcg (0.03 %) nasal spray  Commonly known as: ATROVENT     LORazepam 0.5 MG tablet  Commonly known as: ATIVAN     losartan 50 MG tablet  Commonly known as: COZAAR     REPATHA SURECLICK 140 mg/mL Pnij  Generic drug: evolocumab     SITagliptan-metformin  mg per tablet  Commonly known as: JANUMET             No discharge procedures on file.   Follow-up Information       Javon Mendiola MD Follow up in 1 week(s).    Specialties: Cardiology, Interventional Cardiology  Contact information:  [address]  [facility] [address] [phone]                             Condition upon Discharge:  Good          Javon Mendiola MD

## 2023-07-29 NOTE — PLAN OF CARE
07/29/23 0943   Final Note   Assessment Type Final Discharge Note   Anticipated Discharge Disposition Home   Hospital Resources/Appts/Education Provided Provided patient/caregiver with written discharge plan information;Appointments scheduled and added to AVS   Post-Acute Status   Discharge Delays None known at this time

## 2023-07-30 NOTE — PLAN OF CARE
07/29/23 1912   MENJIVAR Message   Medicare Outpatient and Observation Notification regarding financial responsibility Given to patient/caregiver;Explained to patient/caregiver;Signed/date by patient/caregiver   Date MENJIVAR was signed 07/29/23   Time MENJIVAR was signed 1000

## 2023-08-03 NOTE — Clinical Note
The catheter was removed from the ostium   right coronary artery. Type of Form Received:     Form Received (Date) 8/3/23   Form Filled out Yes, 8/8/23   Placed in provider folder Yes

## 2024-04-09 ENCOUNTER — TELEPHONE (OUTPATIENT)
Dept: OPHTHALMOLOGY | Facility: CLINIC | Age: 74
End: 2024-04-09
Payer: OTHER GOVERNMENT

## 2024-04-09 NOTE — TELEPHONE ENCOUNTER
----- Message from Evy Hooker sent at 4/8/2024  5:11 PM CDT -----  Regarding: FW: Referral    ----- Message -----  From: Elizabeth Nieves  Sent: 4/8/2024   4:33 PM CDT  To: Nathaniel [facility] Staff  Subject: Referral                                         Good Evening      [facility] would like to refer the following patient to Dr. Armstrong in the Ophthalmology department. The patients diagnosis is  Scotoma involving central area, bilateral. I have scanned the patients referral and records into .       Thank you,   Elizabeth  [facility]

## 2024-04-19 ENCOUNTER — CLINICAL SUPPORT (OUTPATIENT)
Dept: AUDIOLOGY | Facility: CLINIC | Age: 74
End: 2024-04-19
Payer: MEDICARE

## 2024-04-19 DIAGNOSIS — H93.13 TINNITUS OF BOTH EARS: ICD-10-CM

## 2024-04-19 DIAGNOSIS — H90.3 SENSORINEURAL HEARING LOSS, BILATERAL: Primary | ICD-10-CM

## 2024-04-19 PROCEDURE — 92557 COMPREHENSIVE HEARING TEST: CPT | Mod: PBBFAC | Performed by: AUDIOLOGIST

## 2024-04-19 PROCEDURE — 92567 TYMPANOMETRY: CPT | Mod: PBBFAC | Performed by: AUDIOLOGIST

## 2024-04-19 NOTE — PROGRESS NOTES
Marcelo Mueller was seen today in the clinic for an audiologic evaluation.  Patient's main complaint was he lost his hearing aids and needs an updated audiogram to get new ones.  Mr. Mueller reported he has a history in the [employer]. He reported that he has tinnitus in both ears and perceives the right as louder than the left. He also reported occasional fullness in both ears. Mr. Mueller noted that he has difficulty with drifting one way or the other when walking as well as feeling like he will fall when he turns over in bed. He could not confirm if the room is spinning but does feel like when he moves in bed that he feels like he will fall off. This occurs nightly and has been for some time. Mr. Mueller described the sensation as lasting seconds. He denied any falls or head injuries.     Otoscopy revealed clear EAC's with visible TM's in both ears.    Tympanometry revealed Type A in the right ear and Type A in the left ear. Reflexes were attempted but not able to be completed as patient could not tolerate the hermetic seal of the tympanometer for long.     Audiogram results revealed a mild to severe sensorineural hearing loss (SNHL) in the right and left ear.      Speech reception thresholds were noted at 30 dB in the right ear and 20 dB in the left ear.    Speech discrimination scores were 76% in the right ear and 76% in the left ear. Left speech discrimination was completed at a lower threshold with only a 52% accuracy.     Results were reviewed with Mr. Mueller following testing. He reported that he will be visiting the Audiology clinic soon to inquire about ordering hearing aids now that he has an updated audiogram. He was advised to message or call with any questions.    Recommendations:  Annual audiogram  Hearing protection when in noise  Hearing aid consultation

## 2025-03-17 NOTE — Clinical Note
The PA catheter was removed from the right atrium. You can resume Eliquis tonight (evening of 3/19) and then continue as previously prescribed.    We ordered an EKG to assess your heart rate and rhythm. Please call one of the locations below to get this scheduled. Have this done on 3/24/25.      [facility] [place] [facility]   [address], [address]   [place] [address]   Phone: [phone]     [facility]  [address] [address]  Phone: [phone]    [place]:   [address]  [place] [address] [phone]    Pacemaker Implant Discharge Instructions:         Activity  For the next 24 hours:  Follow these guidelines related to the sedation medicine that you've received. The sedation medicine clears from your body in approximately 24 hours. These guidelines are for your safety.    You may feel tired, unsteady, or not quite yourself for the remainder of the day due to the sedation medicine.   Use extra caution with moving around to ensure that you do not trip or fall. If possible, have someone with you for the remainder of the day to help as needed. If you are alone, have a phone number of someone available to help you if needed.                              No driving for 24 hours from the time that you received sedation. Today you must have someone drive you home. When you do drive, be sure the incision is well padded/protected from the safety shoulder restraint.  Do not operate machinery or power tools.  Do not make any important decisions or sign any legal documents.  If you use narcotic pain relievers, sleeping pills, or as needed anti-anxiety medicine,use these medicines with extra caution. They may affect you differently with the sedation medicine in your system.   Do not drink alcohol or smoke.      Activity:   Due to the new device implant, these following restrictions are to help the new leads of the device correctly heal into place in the heart tissue and not become                    dislodged:  Do not lift,  pull or push anything heavier than 10 pounds (like a gallon of milk) on the side of your implant for 6 weeks.  No repetitive arm motions on the affected side (examples: golfing, vacuuming) for 6 weeks.  In order to prevent lifting/reaching above your head, keep your elbow below the level of your shoulder on the side of your implant for 6 weeks. This allows the newly implanted lead(s) in your heart to firmly secure to the heart.       Arm Immobilizer:    We've provided you with an arm immobilizer. You should wear this only when you sleep for 2 weeks. This will prevent you from inadvertently lifting your arm above your head while you sleep. Do not wear the immobilizer during the day as we do not want you to develop a frozen shoulder.      Traveling:   Take your ID card with you when traveling.   Inform airport security that you have a device and show your card.       Diet:  The effects of anesthetics may cause some people to have trouble with nausea.  If you have nausea, eat a bland diet; toast, jello, crackers, sherbet, broths. Advance to more solid foods as tolerated. Avoid greasy or spicy food for 24 hours.      Wound/Site Care:   You may have a white pressure dressing over your incision site dressing. This thick dressing applies extra pressure on the incision to reduce bleeding and swelling chances. Remove the thick white pressure dressing the day after your procedure. Take the outer dressing off carefully. Do not remove the silver incision dressing underneath the outer dressing.  The silver dressing covering your device incision contains silver which helps reduces the risk for infection.   Do not remove this silver dressing. The device nurse will remove the dressing for you at your follow-up visit.   You may shower 2 days after your device was implanted. The dressing is waterproof. Gently pat the dressing dry with a clean towel.  No submersion under water of the incision for up to 8 weeks   Look at the dressing  daily and call immediately if there is any bleeding or drainage that saturates the dressing.  Do not scratch, rub or pick at the dressing. Call the office if your bandage starts coming loose.   Call with any significant redness, tenderness, warmth or swelling around the site. Mild discomfort and bruising are common.  Call if you notice a foul odor to your incision site or develop an elevated temperature (100.4 degrees F).  No baths, swimming or submerging in water until your incision site has completely healed (at least 8 weeks).  Do not apply powders, lotions or home remedies to your incision for at least 8 weeks.      Pain Management: You may have some pain, swelling, and soreness at your procedure site. To relieve some of these symptoms, you may try the following;  Take Extra Strength Tylenol for discomfort unless otherwise directed by your doctor (do not exceed 4000mg per day).  Take pain medicine before the pain gets too strong. Pain relievers work more effectively if taken before the pain gets severe.  Take pain medicine 30-40 minutes before any activities that cause discomfort or pain.   If pain keeps you up at night, take pain medicine before going to bed.      In addition to medicine, there are other ways to help decrease pain;  Cold- Apply ice to the incision site for 20 minutes at occasional intervals throughout the day.  Place a barrier between the the skin and the ice pack.   Use relaxation techniques; slow deep breathing, imagery, watching tv or listening to music      To help prevent getting an infection  Clean your hands often especially when doing anything with your new incision. Use soap and water for at least 15 seconds and/or an alcohol-based hand rub.  Remind your family and visitors to clean their hands often also. If they are sick, they should not visit you.    When to call your Doctor:  Significant bleeding or swelling at the procedure site.  Drainage, pus, or blood coming from the  incisions  Opening of the edges of the incisions  InCreased tenderness around the incisions  Temperature - warmth around the incisions or fever (100.4 degrees F)  Odor from the wound  Redness worsening around the incisions  Swelling to site  Significant pain at the site that is not controlled with ice or pain relievers.       Follow Up:  Our  will be arranging a 2 week follow-up for you. If the follow-up has already been scheduled prior to your discharge, the appointment will be listed below. If it has not yet been scheduled, the physician's  will call you to arrange follow-up. If you do not hear from the schedulers within 1 week, please call the number below to arrange a follow up visit.   Your remote transmitter has the ability to send information to your provider about your heart rhythm. Your remote monitoring box will send the transmission automatically for you. While this is not to be relied upon as an emergency alert system, it’s a helpful tool for your doctor to obtain information without you needing to come to the office. Please call the office if you have questions or do not understand how to use the transmitter.       Electrophysiologist's office phone number: [phone]    Future Appointments   Date Time Provider Department Center   4/3/2025 11:20 AM GMOB DEVICE CHECK [place] GMOBEP OB   4/18/2025  8:30 AM SB ACL LAB ACLSHE [facility]   4/25/2025 11:00 AM Masha Her PA-C SBCFP [facility]   5/28/2025  1:00 PM [facility] BD [id number] [facility]   6/26/2025 11:15 AM Cleo Hogan MD GMOBEP GMOB   6/26/2025 11:15 AM GMOB RANJEET DEVICE CHECK [place] GMOBEP GMOB   7/7/2025  2:45 PM [facility]           Remote Monitoring:    The implanted device is monitoring your rhythm at all times and will only send an alert through to our office if there is something detected outside the parameters that are set by the physician. Every night, your monitor checks in with your cardiac  device. If there are any events your device is programmed to report (lead issues, shocks, certain arrhythmias), it will send an automatic transmission for us to review during business hours. Home monitoring is a convenient resource to monitor the function of your device during business hours. Although we do get important information from your home monitor regarding arrhythmias and device function, please be aware that we do not review transmissions outside of business hours as remote home monitoring is not an emergency service. If you have a medical emergency, please report to the Emergency Department.    Every 3-3.5 months, your device will be checked. This will occur either in the office or via your home monitor system. Home remote monitoring will include an every 90 day summary report that will be sent to your electrophysiology MD. You will only be contacted about these reports if there is something that falls out of the normal range. If we do not contact you about the remote transmission you can be assured that all was in the normal range. These scheduled checks are billed.    If you have problems with your home monitoring system please contact the  to help trouble shoot the equipment:    [employer] [phone]  [employer] [phone]  [employer] [phone]  [employer] [phone]    Should you have any questions or concerns after your visit please call the Electrophysiology Nurse Triage Line at [phone].          YOUR DEVICE AND FREQUENTLY USED ELECTRONICS:  -Many people have questions regarding their implantable device and the use of electronics. Below is a list of frequently used electronics. Please note this list does not contain all electronics. If you do not see the electronic device on this list, you may either call our office Monday- Friday between 8:00am-5:00 pm or you can call your device company at any time.  -There are also special  considerations for welding and MRIs. This may be different for each individual and will be reviewed on a case by case basis. Please discuss this with your provider.     HOUSEHOLD ITEMS, OFFICE ITEMS  and TOOLS/ INDUSTRIAL EQUIPMENT                                                   No Known Risk:                                                                                                                       Battery Charger - for household batteries  [place] Slot Machine  CD/DVD/VHS Player or Recorder  Curling Iron    Electric [place]  Electric Guitar  Electric Toothbrush  Electronic Weight Scale  Garage   Hair   Heating Pad  Hot Tub  Ionized Air Filter  Iron  Kitchen Appliances - small and large (, can opener, refrigerator, stove, toaster)  Low Voltage Residential Power Lines  Massage Chair/Pad  Medical Alert Necklace  Microwave Oven  Remote Control (CD, DVD Player, TV, VHS)  Salon Hair Dryer  Sauna  Shaver/Joshua - battery powered  Tanning Bed  TV    Activity Bands (FitBit, Body Bug, Nike+, Jawbone)  Calculator  Copy Machine  Desktop/Laptop Computer  Digital Music player (iPod®) - non-transmitting  Fax Machine  Global Positioning System (GPS)  Infrared Scanner  LifeAlert Pendant  Pager -  only  Printer  Radio AM/FM  Scanner    Calipers - battery powered  Flashlight - battery powered  Laser Level  Soldering Iron  Stud Finder    MINIMAL RISK:   Maintain at least a 6-inch distance between the item and your heart device:    Electric Grocery Cart/Golf Cart - from motor  Electric Kitchen Appliances - hand-held (electric mixer or knife)  Electric Shaver - corded  Electric Toothbrush Charging Base  Exercise Bike - from magnet in wheel  Hair Dryer - hand-held  Hand-held Back Massager  Magnetic Therapy Products  Radio-Controlled Items - from antenna  Sewing Machine/ - from motor  Small Magnet (household magnet)  Speakers  Tattoo Machine  Treadmill - from  motor  Ultrasonic   Vacuum  - from motor    Amateur Radio, Ham Radio, Marine Radio, Walkie Talkie (3 villa or less) - from antenna  Cellular Phone (3 villa or less) - from antenna  Cordless Headphone Sending Unit (i.e., TV Ears®)  Digital Music Player (iPod) - transmitting  [place] Magic Bands (6\" from band reader, band itself has no known risk)  Electronic [place]/Reading Devices  Electronic Tablets (i.e., Skyeng, iPad, Surface)  Home Wireless Electronics - from antenna  OnStar™ Technology - from antenna  Remote Keyless Entry - Key Fob (such as Smart Key)  Remote   Smart Meter (utility companies)  Security Badge Wall Scanner  Wireless Communication devices (computers, headsets, modems, routers, smartphones, Bluetooth®)  Wireless Controllers (Video Game Consoles, Accedo, AdCare Health Systemstation, BBE)    Circular Saw - skill saw  Drills - battery and electric powered  Electric Chainsaw   (hand-held)   - electric powered   - electric powered  Leaf Blower - electric powered  Reciprocating Saw (Sawzall®)  Router  Zac  Screwdriver - battery powered  Soldering Gun  Weed Whacker - electric powered      SPECIAL CONSIDERATIONS:   Maintain at least the recommended distance between the item and your heart device:    12-Inch Distance  Car/Motorcycle - from components of ignition system  Electric Fence  Electric Pet Containment Fence - from buried wire and indoor antenna  Transformer Box (green box in yard)    Amateur Radio, Ham Radio, Marine Radio, Walkie Talkie (between 3-15 villa) - from antenna  Citizens Band (CB) Radio (5 villa or less) - from antenna  Uninterrupted Power Source (UPS)    Boat Motor  Car Battery Charger - 100 amps or less  Gasoline Ignition Systems - from components of ignition system  Gasoline Powered Tools - from components of ignition system (, snowblower, weed whacker, chainsaw)    2-Foot Distance  Beach Comber Metal Detector - from search  head  Induction Cooktop Stove    Amateur Radio, Ham Radio, Marine Radio, Walkie Talkie (between 15-30 villa) - from antenna    Bench Mounted/Free Standing Tools - for motors 400 horsepower or less (air compressor, drill presses, , , table saw)  Jumper Cables  Welding Equipment  (with currents under 160 amps,   -further information can be provided by device company technical services)    Not Recommended  Ab Stimulator  Electronic Body Fat Scale  Magnetic Mattress Pad/Pillow    Welding Equipment  (with currents over 160 amps,  -further information can be provided by device company technical services).

## 2025-07-09 ENCOUNTER — TELEPHONE (OUTPATIENT)
Dept: RESEARCH | Facility: HOSPITAL | Age: 75
End: 2025-07-09
Payer: OTHER GOVERNMENT

## 2025-07-09 NOTE — TELEPHONE ENCOUNTER
Study title: GARETT LE  IRB #: [id number]      Patient called to discuss participation into the GARETT LE study per referral from friend. CRC briefly explained overview of study and sent patient copy of consent form over email for review at convenience.    Patient expressed interest in traveling to [facility]. Patient verbalized understanding and was agreeable to continue care at [facility].    Patient voiced understanding and expressed interest in a screening visit at [facility] on 7/14  @ 10am with spouse.     Reminder [employer] message will be sent about appointment.

## 2025-08-05 ENCOUNTER — HOSPITAL ENCOUNTER (OUTPATIENT)
Dept: PREADMISSION TESTING | Facility: OTHER | Age: 75
Discharge: HOME | End: 2025-08-05
Attending: INTERNAL MEDICINE
Payer: MEDICARE

## 2025-08-05 VITALS
BODY MASS INDEX: 29.57 KG/M2 | HEIGHT: 66 IN | DIASTOLIC BLOOD PRESSURE: 77 MMHG | OXYGEN SATURATION: 98 % | TEMPERATURE: 98 F | WEIGHT: 184 LBS | SYSTOLIC BLOOD PRESSURE: 143 MMHG | HEART RATE: 62 BPM | RESPIRATION RATE: 16 BRPM

## 2025-08-05 DIAGNOSIS — I73.9 PERIPHERAL VASCULAR DISEASE, UNSPECIFIED: ICD-10-CM

## 2025-08-05 DIAGNOSIS — Z01.818 PREOP TESTING: Primary | ICD-10-CM

## 2025-08-05 LAB
ABSOLUTE EOSINOPHIL (OHS): 0.22 K/UL
ABSOLUTE MONOCYTE (OHS): 0.63 K/UL (ref 0.3–1)
ABSOLUTE NEUTROPHIL COUNT (OHS): 5.08 K/UL (ref 1.8–7.7)
ALBUMIN SERPL BCP-MCNC: 4.3 G/DL (ref 3.5–5.2)
ALP SERPL-CCNC: 75 UNIT/L (ref 40–150)
ALT SERPL W/O P-5'-P-CCNC: 17 UNIT/L (ref 10–44)
ANION GAP (OHS): 7 MMOL/L (ref 8–16)
APTT PPP: 28.4 SECONDS (ref 21–32)
AST SERPL-CCNC: 26 UNIT/L (ref 11–45)
BASOPHILS # BLD AUTO: 0.04 K/UL
BASOPHILS NFR BLD AUTO: 0.5 %
BILIRUB SERPL-MCNC: 0.5 MG/DL (ref 0.1–1)
BUN SERPL-MCNC: 13 MG/DL (ref 8–23)
CALCIUM SERPL-MCNC: 9.4 MG/DL (ref 8.7–10.5)
CHLORIDE SERPL-SCNC: 107 MMOL/L (ref 95–110)
CO2 SERPL-SCNC: 25 MMOL/L (ref 23–29)
CREAT SERPL-MCNC: 1 MG/DL (ref 0.5–1.4)
ERYTHROCYTE [DISTWIDTH] IN BLOOD BY AUTOMATED COUNT: 13.2 % (ref 11.5–14.5)
GFR SERPLBLD CREATININE-BSD FMLA CKD-EPI: >60 ML/MIN/1.73/M2
GLUCOSE SERPL-MCNC: 88 MG/DL (ref 70–110)
HCT VFR BLD AUTO: 46.9 % (ref 40–54)
HGB BLD-MCNC: 15 GM/DL (ref 14–18)
IMM GRANULOCYTES # BLD AUTO: 0.03 K/UL (ref 0–0.04)
IMM GRANULOCYTES NFR BLD AUTO: 0.4 % (ref 0–0.5)
INR PPP: 1 (ref 0.8–1.2)
LYMPHOCYTES # BLD AUTO: 1.86 K/UL (ref 1–4.8)
MCH RBC QN AUTO: 29.5 PG (ref 27–31)
MCHC RBC AUTO-ENTMCNC: 32 G/DL (ref 32–36)
MCV RBC AUTO: 92 FL (ref 82–98)
NUCLEATED RBC (/100WBC) (OHS): 0 /100 WBC
PLATELET # BLD AUTO: 246 K/UL (ref 150–450)
PMV BLD AUTO: 11 FL (ref 9.2–12.9)
POTASSIUM SERPL-SCNC: 4.2 MMOL/L (ref 3.5–5.1)
PROT SERPL-MCNC: 6.9 GM/DL (ref 6–8.4)
PROTHROMBIN TIME: 10.9 SECONDS (ref 9–12.5)
RBC # BLD AUTO: 5.09 M/UL (ref 4.6–6.2)
RELATIVE EOSINOPHIL (OHS): 2.8 %
RELATIVE LYMPHOCYTE (OHS): 23.7 % (ref 18–48)
RELATIVE MONOCYTE (OHS): 8 % (ref 4–15)
RELATIVE NEUTROPHIL (OHS): 64.6 % (ref 38–73)
SODIUM SERPL-SCNC: 139 MMOL/L (ref 136–145)
WBC # BLD AUTO: 7.86 K/UL (ref 3.9–12.7)

## 2025-08-05 PROCEDURE — 85610 PROTHROMBIN TIME: CPT | Performed by: INTERNAL MEDICINE

## 2025-08-05 PROCEDURE — 85025 COMPLETE CBC W/AUTO DIFF WBC: CPT | Performed by: INTERNAL MEDICINE

## 2025-08-05 PROCEDURE — 93010 ELECTROCARDIOGRAM REPORT: CPT | Mod: ,,, | Performed by: INTERNAL MEDICINE

## 2025-08-05 PROCEDURE — 85730 THROMBOPLASTIN TIME PARTIAL: CPT | Performed by: INTERNAL MEDICINE

## 2025-08-05 PROCEDURE — 82247 BILIRUBIN TOTAL: CPT | Performed by: INTERNAL MEDICINE

## 2025-08-05 PROCEDURE — 93005 ELECTROCARDIOGRAM TRACING: CPT

## 2025-08-05 NOTE — DISCHARGE INSTRUCTIONS
Information to Prepare you for your Surgery    PRE-ADMIT TESTING   [address]  [place] [facility]  [address]     Your surgery has been scheduled at [facility]. We are pleased to have the opportunity to serve you. For Further Information please call [phone].    On the day of surgery please report to Registration on the 1st floor of the [facility].    CONTACT YOUR PHYSICIAN'S OFFICE THE DAY PRIOR TO YOUR SURGERY TO OBTAIN YOUR ARRIVAL TIME.     The evening before surgery do not eat anything after midnight. May take morning medications with sips of water as directed by Dr. Mendiola.     Outpatient Surgery- May allow 2 adults (18 and older)/ Support Persons (1 being the designated ) for all surgical/procedural patients. A breastfeeding mother will be allowed her infant and 2 adult Support Persons. No one under the age of 18 will be allowed in the building.    Angiogram Patients: Take medications as instructed by your physician, including aspirin.     Do Not Wear any make-up (especially eye make-up) to surgery. Please remove any false eyelashes or eyelash extensions. If you arrive the day of surgery with makeup/eyelashes on you will be required to remove prior to surgery. (There is a risk of corneal abrasions if eye makeup/eyelash extensions are not removed)    Leave all valuables at home.   Do Not wear any jewelry or watches, including any metal in body piercings. Jewelry must be removed prior to coming to the hospital.  There is a possibility that rings that are unable to be removed may be cut off if they are on the surgical extremity.    Please remove all hair extensions, wigs, clips and any other metal accessories/ ornaments from your hair.  These items may pose a flammable/fire risk in Surgery and must be removed.    Do not shave your surgical area at least 5 days prior to your surgery. The surgical prep will be performed at the hospital according to Infection Control  regulations.    Contact Lens must be removed before surgery. Either do not wear the contact lens or bring a case and solution for storage.  Please bring a container for eyeglasses or dentures as required.  Bring any paperwork your physician has provided, such as consent forms,  history and physicals, doctor's orders, etc.   Bring comfortable clothes that are loose fitting to wear upon discharge. Take into consideration the type of surgery being performed.  Maintain your diet as advised per your physician the day prior to surgery.    Adequate rest the night before surgery is advised.   Park in the Parking lot behind the hospital or in the [employer] [facility] across the street from the parking lot. Parking is complimentary.  If you will be discharged the same day as your procedure, please arrange for a responsible adult to drive you home or to accompany you if traveling by taxi.   YOU WILL NOT BE PERMITTED TO DRIVE OR TO LEAVE THE HOSPITAL ALONE AFTER SURGERY.   If you are being discharged the same day, it is strongly recommended that you arrange for someone to remain with you for the first 24 hrs following your surgery.    The Surgeon will speak to your family/visitor after your surgery regarding the outcome of your surgery and post op care.  The Surgeon may speak to you after your surgery, but there is a possibility you may not remember the details.  Please check with your family members regarding the conversation with the Surgeon.    We strongly recommend whoever is bringing you home be present for discharge instructions.  This will ensure a thorough understanding for your post op home care.              Bathing Instructions with Hibiclens  Shower the evening before and morning of your procedure with Chlorhexidine (Hibiclens)    Do not use Chlorhexidine on your face or genitals. Do not get in your eyes.  Wash your face with water and your regular face wash/soap  Use your regular shampoo  Apply Chlorhexidine  (Hibiclens) directly on your skin or on a wet washcloth and wash gently. When showering: Move away from the shower stream when applying Chlorhexidine (Hibiclens) to avoid rinsing off too soon.  Rinse thoroughly with warm water  Do not dilute Chlorhexidine (Hibiclens)   Dry off as usual, do not use any deodorant, powder, body lotions, perfume, after shave or cologne.     If the patient has fever, cough, or signs/symptoms of Flu or Covid please do not come in for your surgery.   Contact your surgeon and your primary care physician for further instructions.   Please also call [facility] Outpatient Surgery [phone]. The unit opens at 5 AM.    If applicable, please bring your blood pressure & diabetes medications the day of surgery.

## 2025-08-07 LAB
OHS QRS DURATION: 80 MS
OHS QTC CALCULATION: 416 MS

## 2025-08-08 ENCOUNTER — HOSPITAL ENCOUNTER (OUTPATIENT)
Facility: OTHER | Age: 75
Discharge: HOME | End: 2025-08-09
Attending: INTERNAL MEDICINE | Admitting: INTERNAL MEDICINE
Payer: MEDICARE

## 2025-08-08 DIAGNOSIS — I73.9 PERIPHERAL VASCULAR DISEASE, UNSPECIFIED: ICD-10-CM

## 2025-08-08 DIAGNOSIS — Z01.818 PREOP TESTING: Primary | ICD-10-CM

## 2025-08-08 LAB
OHS CV CPX PATIENT HEIGHT IN: 66
POCT GLUCOSE: 125 MG/DL (ref 70–110)

## 2025-08-08 PROCEDURE — C1769 GUIDE WIRE: HCPCS | Performed by: INTERNAL MEDICINE

## 2025-08-08 PROCEDURE — 63600175 PHARM REV CODE 636 W HCPCS: Performed by: INTERNAL MEDICINE

## 2025-08-08 PROCEDURE — 94799 UNLISTED PULMONARY SVC/PX: CPT

## 2025-08-08 PROCEDURE — 25000003 PHARM REV CODE 250: Performed by: INTERNAL MEDICINE

## 2025-08-08 PROCEDURE — C1760 CLOSURE DEV, VASC: HCPCS | Performed by: INTERNAL MEDICINE

## 2025-08-08 PROCEDURE — C1887 CATHETER, GUIDING: HCPCS | Performed by: INTERNAL MEDICINE

## 2025-08-08 PROCEDURE — 99153 MOD SED SAME PHYS/QHP EA: CPT | Performed by: INTERNAL MEDICINE

## 2025-08-08 PROCEDURE — 99152 MOD SED SAME PHYS/QHP 5/>YRS: CPT | Performed by: INTERNAL MEDICINE

## 2025-08-08 PROCEDURE — 99900035 HC TECH TIME PER 15 MIN (STAT)

## 2025-08-08 PROCEDURE — 75630 X-RAY AORTA LEG ARTERIES: CPT | Performed by: INTERNAL MEDICINE

## 2025-08-08 PROCEDURE — 37225 HC FEM/POPL REVAS W/ATHER: CPT | Mod: LT | Performed by: INTERNAL MEDICINE

## 2025-08-08 PROCEDURE — C1894 INTRO/SHEATH, NON-LASER: HCPCS | Performed by: INTERNAL MEDICINE

## 2025-08-08 PROCEDURE — C2623 CATH, TRANSLUMIN, DRUG-COAT: HCPCS | Performed by: INTERNAL MEDICINE

## 2025-08-08 PROCEDURE — 27201423 OPTIME MED/SURG SUP & DEVICES STERILE SUPPLY: Performed by: INTERNAL MEDICINE

## 2025-08-08 PROCEDURE — 25500020 PHARM REV CODE 255: Performed by: INTERNAL MEDICINE

## 2025-08-08 PROCEDURE — C1725 CATH, TRANSLUMIN NON-LASER: HCPCS | Performed by: INTERNAL MEDICINE

## 2025-08-08 PROCEDURE — 94761 N-INVAS EAR/PLS OXIMETRY MLT: CPT

## 2025-08-08 RX ORDER — CLOPIDOGREL BISULFATE 75 MG/1
75 TABLET ORAL DAILY
Status: DISCONTINUED | OUTPATIENT
Start: 2025-08-09 | End: 2025-08-08

## 2025-08-08 RX ORDER — AZELASTINE 1 MG/ML
1 SPRAY, METERED NASAL 2 TIMES DAILY
Status: DISCONTINUED | OUTPATIENT
Start: 2025-08-08 | End: 2025-08-09 | Stop reason: HOSPADM

## 2025-08-08 RX ORDER — LANOLIN ALCOHOL/MO/W.PET/CERES
1000 CREAM (GRAM) TOPICAL DAILY
Status: DISCONTINUED | OUTPATIENT
Start: 2025-08-08 | End: 2025-08-09 | Stop reason: HOSPADM

## 2025-08-08 RX ORDER — DIPHENHYDRAMINE HYDROCHLORIDE 50 MG/ML
25 INJECTION, SOLUTION INTRAMUSCULAR; INTRAVENOUS EVERY 6 HOURS PRN
Status: DISCONTINUED | OUTPATIENT
Start: 2025-08-08 | End: 2025-08-09 | Stop reason: HOSPADM

## 2025-08-08 RX ORDER — LIDOCAINE HYDROCHLORIDE 10 MG/ML
INJECTION, SOLUTION INFILTRATION; PERINEURAL
Status: DISCONTINUED | OUTPATIENT
Start: 2025-08-08 | End: 2025-08-08 | Stop reason: HOSPADM

## 2025-08-08 RX ORDER — ASPIRIN 81 MG/1
81 TABLET ORAL 2 TIMES DAILY
Status: DISCONTINUED | OUTPATIENT
Start: 2025-08-08 | End: 2025-08-09 | Stop reason: HOSPADM

## 2025-08-08 RX ORDER — ALPRAZOLAM 0.25 MG/1
0.25 TABLET ORAL EVERY 8 HOURS PRN
Status: DISCONTINUED | OUTPATIENT
Start: 2025-08-08 | End: 2025-08-09 | Stop reason: HOSPADM

## 2025-08-08 RX ORDER — LIDOCAINE HYDROCHLORIDE 10 MG/ML
10 INJECTION, SOLUTION EPIDURAL; INFILTRATION; INTRACAUDAL; PERINEURAL ONCE AS NEEDED
Status: DISCONTINUED | OUTPATIENT
Start: 2025-08-08 | End: 2025-08-09 | Stop reason: HOSPADM

## 2025-08-08 RX ORDER — CLOPIDOGREL BISULFATE 75 MG/1
75 TABLET ORAL DAILY
Status: DISCONTINUED | OUTPATIENT
Start: 2025-08-09 | End: 2025-08-09 | Stop reason: HOSPADM

## 2025-08-08 RX ORDER — DIAZEPAM 5 MG/1
5 TABLET ORAL
Status: COMPLETED | OUTPATIENT
Start: 2025-08-08 | End: 2025-08-08

## 2025-08-08 RX ORDER — BROMOCRIPTINE MESYLATE 2.5 MG/1
2.5 TABLET ORAL DAILY
Status: DISCONTINUED | OUTPATIENT
Start: 2025-08-08 | End: 2025-08-09 | Stop reason: HOSPADM

## 2025-08-08 RX ORDER — ACETAMINOPHEN 325 MG/1
650 TABLET ORAL EVERY 4 HOURS PRN
Status: DISCONTINUED | OUTPATIENT
Start: 2025-08-08 | End: 2025-08-09 | Stop reason: HOSPADM

## 2025-08-08 RX ORDER — METFORMIN HYDROCHLORIDE 500 MG/1
500 TABLET ORAL 2 TIMES DAILY WITH MEALS
Status: DISCONTINUED | OUTPATIENT
Start: 2025-08-08 | End: 2025-08-09 | Stop reason: HOSPADM

## 2025-08-08 RX ORDER — LOSARTAN POTASSIUM 50 MG/1
100 TABLET ORAL DAILY
Status: DISCONTINUED | OUTPATIENT
Start: 2025-08-08 | End: 2025-08-08

## 2025-08-08 RX ORDER — ASPIRIN 81 MG/1
81 TABLET ORAL DAILY
Status: DISCONTINUED | OUTPATIENT
Start: 2025-08-09 | End: 2025-08-08

## 2025-08-08 RX ORDER — MIDAZOLAM HYDROCHLORIDE 1 MG/ML
INJECTION INTRAMUSCULAR; INTRAVENOUS
Status: DISCONTINUED | OUTPATIENT
Start: 2025-08-08 | End: 2025-08-08 | Stop reason: HOSPADM

## 2025-08-08 RX ORDER — LOSARTAN POTASSIUM 50 MG/1
100 TABLET ORAL NIGHTLY
Status: DISCONTINUED | OUTPATIENT
Start: 2025-08-09 | End: 2025-08-09 | Stop reason: HOSPADM

## 2025-08-08 RX ORDER — HYDROCODONE BITARTRATE AND ACETAMINOPHEN 10; 325 MG/1; MG/1
1 TABLET ORAL EVERY 4 HOURS PRN
Refills: 0 | Status: DISCONTINUED | OUTPATIENT
Start: 2025-08-08 | End: 2025-08-09 | Stop reason: HOSPADM

## 2025-08-08 RX ORDER — ATORVASTATIN CALCIUM 20 MG/1
80 TABLET, FILM COATED ORAL NIGHTLY
Status: DISCONTINUED | OUTPATIENT
Start: 2025-08-09 | End: 2025-08-09 | Stop reason: HOSPADM

## 2025-08-08 RX ORDER — IODIXANOL 320 MG/ML
INJECTION, SOLUTION INTRAVASCULAR
Status: DISCONTINUED | OUTPATIENT
Start: 2025-08-08 | End: 2025-08-08 | Stop reason: HOSPADM

## 2025-08-08 RX ORDER — SODIUM CHLORIDE 9 MG/ML
INJECTION, SOLUTION INTRAVENOUS CONTINUOUS
Status: ACTIVE | OUTPATIENT
Start: 2025-08-08 | End: 2025-08-08

## 2025-08-08 RX ORDER — HEPARIN SOD,PORCINE/0.9 % NACL 1000/500ML
INTRAVENOUS SOLUTION INTRAVENOUS
Status: DISCONTINUED | OUTPATIENT
Start: 2025-08-08 | End: 2025-08-08 | Stop reason: HOSPADM

## 2025-08-08 RX ORDER — FENTANYL CITRATE 50 UG/ML
INJECTION, SOLUTION INTRAMUSCULAR; INTRAVENOUS
Status: DISCONTINUED | OUTPATIENT
Start: 2025-08-08 | End: 2025-08-08 | Stop reason: HOSPADM

## 2025-08-08 RX ORDER — HYDROCODONE BITARTRATE AND ACETAMINOPHEN 5; 325 MG/1; MG/1
1 TABLET ORAL EVERY 4 HOURS PRN
Refills: 0 | Status: DISCONTINUED | OUTPATIENT
Start: 2025-08-08 | End: 2025-08-09 | Stop reason: HOSPADM

## 2025-08-08 RX ORDER — NITROGLYCERIN 0.4 MG/1
0.4 TABLET SUBLINGUAL EVERY 5 MIN PRN
Status: DISCONTINUED | OUTPATIENT
Start: 2025-08-08 | End: 2025-08-09 | Stop reason: HOSPADM

## 2025-08-08 RX ORDER — ATROPINE SULFATE 0.1 MG/ML
0.5 INJECTION INTRAVENOUS
Status: DISCONTINUED | OUTPATIENT
Start: 2025-08-08 | End: 2025-08-09 | Stop reason: HOSPADM

## 2025-08-08 RX ORDER — SODIUM CHLORIDE 9 MG/ML
INJECTION, SOLUTION INTRAVENOUS CONTINUOUS
Status: DISCONTINUED | OUTPATIENT
Start: 2025-08-08 | End: 2025-08-08

## 2025-08-08 RX ORDER — CLOPIDOGREL BISULFATE 300 MG/1
TABLET, FILM COATED ORAL
Status: DISCONTINUED | OUTPATIENT
Start: 2025-08-08 | End: 2025-08-08 | Stop reason: HOSPADM

## 2025-08-08 RX ORDER — DIPHENHYDRAMINE HCL 25 MG
25 CAPSULE ORAL
Status: COMPLETED | OUTPATIENT
Start: 2025-08-08 | End: 2025-08-08

## 2025-08-08 RX ORDER — ONDANSETRON 8 MG/1
8 TABLET, ORALLY DISINTEGRATING ORAL EVERY 8 HOURS PRN
Status: DISCONTINUED | OUTPATIENT
Start: 2025-08-08 | End: 2025-08-09 | Stop reason: HOSPADM

## 2025-08-08 RX ORDER — LORAZEPAM 0.5 MG/1
0.5 TABLET ORAL EVERY 6 HOURS PRN
Status: DISCONTINUED | OUTPATIENT
Start: 2025-08-08 | End: 2025-08-09 | Stop reason: HOSPADM

## 2025-08-08 RX ORDER — ALUMINUM HYDROXIDE, MAGNESIUM HYDROXIDE, AND SIMETHICONE 1200; 120; 1200 MG/30ML; MG/30ML; MG/30ML
15 SUSPENSION ORAL EVERY 6 HOURS PRN
Status: DISCONTINUED | OUTPATIENT
Start: 2025-08-08 | End: 2025-08-09 | Stop reason: HOSPADM

## 2025-08-08 RX ORDER — AMLODIPINE BESYLATE 2.5 MG/1
2.5 TABLET ORAL DAILY
Status: DISCONTINUED | OUTPATIENT
Start: 2025-08-09 | End: 2025-08-09 | Stop reason: HOSPADM

## 2025-08-08 RX ORDER — ATORVASTATIN CALCIUM 20 MG/1
80 TABLET, FILM COATED ORAL DAILY
Status: DISCONTINUED | OUTPATIENT
Start: 2025-08-08 | End: 2025-08-08

## 2025-08-08 RX ADMIN — DIAZEPAM 5 MG: 5 TABLET ORAL at 07:08

## 2025-08-08 RX ADMIN — AZELASTINE 137 MCG: 1 SPRAY, METERED NASAL at 09:08

## 2025-08-08 RX ADMIN — DIPHENHYDRAMINE HYDROCHLORIDE 25 MG: 25 CAPSULE ORAL at 07:08

## 2025-08-08 RX ADMIN — SODIUM CHLORIDE: 9 INJECTION, SOLUTION INTRAVENOUS at 11:08

## 2025-08-08 RX ADMIN — SODIUM CHLORIDE: 9 INJECTION, SOLUTION INTRAVENOUS at 07:08

## 2025-08-08 RX ADMIN — ASPIRIN 81 MG: 81 TABLET, COATED ORAL at 09:08

## 2025-08-09 VITALS
WEIGHT: 184 LBS | SYSTOLIC BLOOD PRESSURE: 133 MMHG | TEMPERATURE: 98 F | HEIGHT: 66 IN | RESPIRATION RATE: 16 BRPM | BODY MASS INDEX: 29.57 KG/M2 | HEART RATE: 64 BPM | OXYGEN SATURATION: 95 % | DIASTOLIC BLOOD PRESSURE: 62 MMHG

## 2025-08-09 LAB
ANION GAP (OHS): 6 MMOL/L (ref 8–16)
BUN SERPL-MCNC: 12 MG/DL (ref 8–23)
CALCIUM SERPL-MCNC: 8.5 MG/DL (ref 8.7–10.5)
CHLORIDE SERPL-SCNC: 111 MMOL/L (ref 95–110)
CO2 SERPL-SCNC: 23 MMOL/L (ref 23–29)
CREAT SERPL-MCNC: 0.8 MG/DL (ref 0.5–1.4)
GFR SERPLBLD CREATININE-BSD FMLA CKD-EPI: >60 ML/MIN/1.73/M2
GLUCOSE SERPL-MCNC: 121 MG/DL (ref 70–110)
HCT VFR BLD AUTO: 41.3 % (ref 40–54)
HGB BLD-MCNC: 13.4 GM/DL (ref 14–18)
POTASSIUM SERPL-SCNC: 3.9 MMOL/L (ref 3.5–5.1)
SODIUM SERPL-SCNC: 140 MMOL/L (ref 136–145)

## 2025-08-09 PROCEDURE — 82310 ASSAY OF CALCIUM: CPT | Performed by: INTERNAL MEDICINE

## 2025-08-09 PROCEDURE — 85018 HEMOGLOBIN: CPT | Performed by: INTERNAL MEDICINE

## 2025-08-09 PROCEDURE — 94761 N-INVAS EAR/PLS OXIMETRY MLT: CPT

## 2025-08-09 PROCEDURE — 25000003 PHARM REV CODE 250: Performed by: INTERNAL MEDICINE

## 2025-08-09 PROCEDURE — 85014 HEMATOCRIT: CPT | Performed by: INTERNAL MEDICINE

## 2025-08-09 RX ADMIN — SITAGLIPTIN 50 MG: 25 TABLET, FILM COATED ORAL at 08:08

## 2025-08-09 RX ADMIN — METFORMIN HYDROCHLORIDE 500 MG: 500 TABLET ORAL at 08:08

## 2025-08-09 RX ADMIN — AZELASTINE 137 MCG: 1 SPRAY, METERED NASAL at 08:08

## 2025-08-09 RX ADMIN — ASPIRIN 81 MG: 81 TABLET, COATED ORAL at 08:08

## 2025-08-09 RX ADMIN — AMLODIPINE BESYLATE 2.5 MG: 2.5 TABLET ORAL at 08:08

## 2025-08-09 RX ADMIN — CYANOCOBALAMIN TAB 1000 MCG 1000 MCG: 1000 TAB at 08:08

## 2025-08-09 RX ADMIN — CLOPIDOGREL 75 MG: 75 TABLET ORAL at 08:08

## (undated) DEVICE — CATH ULTRAVERSE 014 2.5X8X150

## (undated) DEVICE — LINE HIGH PRESSURE TUBING 48

## (undated) DEVICE — CATH DXTERITY AL20 100CM 6FR

## (undated) DEVICE — CATH ULTRAVERSE 014 3X8X150

## (undated) DEVICE — GUIDEWIRE VIPER FIRM .014

## (undated) DEVICE — INTRODUCER CATH 6F 11CM

## (undated) DEVICE — CATH DIAMONDBACK 2.00MM CLASS

## (undated) DEVICE — VISIPAQUE 320 200ML +PK

## (undated) DEVICE — CATH LUTONIX DCB 035X130X6X220

## (undated) DEVICE — CATH OMNI FLUSH 5F X 90CM

## (undated) DEVICE — TRAY CORONARY CUSTOM BAPTIST

## (undated) DEVICE — CATH DXTERITY PIGSTR 110CM 6FR

## (undated) DEVICE — DEVICE MYNX GRIP 6/7F

## (undated) DEVICE — SYR ANGIO INJ CT/V200 200

## (undated) DEVICE — CATH DXTERITY JR40 100CM 6FR

## (undated) DEVICE — GUIDEWIRE SAFE T .035IN 180CM

## (undated) DEVICE — SHEATH DESTINATION 6F X 45CM

## (undated) DEVICE — VISE RADIFOCUS MULTI TORQUE

## (undated) DEVICE — SYR 10ML LIDOCAINE

## (undated) DEVICE — INTRODUCER CATH 7F 11CML

## (undated) DEVICE — CATH DXTERITY JL40 100CM 6FR

## (undated) DEVICE — CONTRAST VISIPAQUE 150ML

## (undated) DEVICE — LUBRICANT VIPERSLIDE 100ML BAG

## (undated) DEVICE — WIRE ASAHI REGALIA XS 1.0

## (undated) DEVICE — CATH ANGIO SOS 5FR 80CM

## (undated) DEVICE — PRESTO INFLATION DEVICE

## (undated) DEVICE — TRAY ANGIO BAPTIST

## (undated) DEVICE — GOWN SMART IMP BREATHABLE XXLG

## (undated) DEVICE — Device

## (undated) DEVICE — GUIDEWIRE LAUREATE 035IN 260CM

## (undated) DEVICE — BAG SNAP KOVER BAND 36 X 28 IN

## (undated) DEVICE — CATH SEEKER CROSS .018X150

## (undated) DEVICE — CATH LUTONIX DCB 018X130X6X300

## (undated) DEVICE — CATH SWAN GANZ STND 7FR

## (undated) DEVICE — CATH BLLN SEEKER .035IN 135CM

## (undated) DEVICE — CATH DIAMONDBACK 1.25M 145CM